# Patient Record
Sex: MALE | Race: WHITE | Employment: FULL TIME | URBAN - METROPOLITAN AREA
[De-identification: names, ages, dates, MRNs, and addresses within clinical notes are randomized per-mention and may not be internally consistent; named-entity substitution may affect disease eponyms.]

---

## 2017-04-06 ENCOUNTER — APPOINTMENT (OUTPATIENT)
Dept: VASCULAR SURGERY | Facility: CLINIC | Age: 52
End: 2017-04-06
Payer: COMMERCIAL

## 2017-04-06 PROCEDURE — 99207 ZZC VEINSOLUTIONS FREE SCREENING: CPT | Performed by: SURGERY

## 2017-05-25 ENCOUNTER — APPOINTMENT (OUTPATIENT)
Dept: VASCULAR SURGERY | Facility: CLINIC | Age: 52
End: 2017-05-25
Payer: COMMERCIAL

## 2017-05-25 ENCOUNTER — OFFICE VISIT (OUTPATIENT)
Dept: VASCULAR SURGERY | Facility: CLINIC | Age: 52
End: 2017-05-25
Payer: COMMERCIAL

## 2017-05-25 ENCOUNTER — TRANSFERRED RECORDS (OUTPATIENT)
Dept: HEALTH INFORMATION MANAGEMENT | Facility: CLINIC | Age: 52
End: 2017-05-25

## 2017-05-25 PROCEDURE — 93970 EXTREMITY STUDY: CPT | Performed by: SURGERY

## 2017-05-25 PROCEDURE — 99204 OFFICE O/P NEW MOD 45 MIN: CPT | Performed by: SURGERY

## 2017-07-10 NOTE — PROGRESS NOTES
[facility]    Letter to [employer]  RE Truong Oreilly   63    To Whom It May Concern:    I am writing regarding one of your insureds, Truong Oreilly, regarding your denial for him to have endovenous ablation of symptomatic great saphenous veins.  In the insurance denial, it was stated that the patient's veins were not symptomatic and, therefore, his procedure would not be covered by insurance.    In my dictation of May 25, 2017 it is clear that he is indeed symptomatic.  He describes aching, tiredness, swelling of his legs and has noted increasing brown discoloration of both of his lower extremities.  He has been wearing gradient compression hose for years and is no longer finding adequate relief of his discomfort with him.    I spoke with Mr. Oreilly on the telephone recently and he states that indeed the pain in his legs and the swelling make it difficult for him to perform his work in the afternoons especially because of the pain and swelling.  This causes his leg to be quite heavy and fatigued, making it difficult for him to get around as he should.  He is unable to elevate his legs at work as he would like.    As per my May 25, 2017 dictation, he has hyperpigmentation from just below his knees to his feet bilaterally.    On the right leg he has 8-10 mm varicosities and 1-2+ edema.  There is a firm lipoma sclerosis in the distal third of his legs bilaterally due to chronic venous hypertension and chronic edema.    In the left lower extremity, he has 4-7 mm varicosities scattered over his left medial leg with 1-2+ edema.    He has 3+ dorsalis pedis pulses bilaterally but no palpable posterior tibial pulses.    Duplex ultrasound did reveal incompetence of his great saphenous veins bilaterally with the right great saphenous vein dilated to 13.6 mm in diameter and the left great saphenous vein dilated to 17.7 mm in diameter.  He has incompetent vein branches measuring up to 12.6 mm in diameter  coursing from his right great saphenous vein and 8 mm incompetent vein branches coursing from his left great saphenous vein.    He does have right small saphenous vein incompetence and left anterior accessory saphenous vein incompetence which I do not feel are of clinical significance at this time.    In the right lower extremity, his Venous Clinical Severity Score (VCSS) is 10 with a CEAP 4 classification.  In his left lower extremity, he has a VCSS of 8 and a CEAP 4 classification.  This denotes advanced venous stasis disease and a significant risk of progression to ulceration.    In summary, I feel that your decision to deny treatment of this patient on the basis that treatment of his venous insufficiency is not medically necessary is in error and this determination should be reconsidered.  I would value the opportunity to discuss his case with you should you have further questions.  Thanks again for your consideration in this matter.    Sincerely,    YAQUELIN Cline M.D.

## 2017-08-04 DIAGNOSIS — I83.813 VARICOSE VEINS OF BILATERAL LOWER EXTREMITIES WITH PAIN: ICD-10-CM

## 2017-08-04 DIAGNOSIS — I83.893 VARICOSE VEINS OF BOTH LEGS WITH EDEMA: Primary | ICD-10-CM

## 2017-08-24 DIAGNOSIS — I83.813 VARICOSE VEINS OF BILATERAL LOWER EXTREMITIES WITH PAIN: ICD-10-CM

## 2017-08-24 DIAGNOSIS — I83.893 VARICOSE VEINS OF BOTH LEGS WITH EDEMA: ICD-10-CM

## 2017-08-24 LAB
BASOPHILS # BLD AUTO: 0 10E9/L (ref 0–0.2)
BASOPHILS NFR BLD AUTO: 0.8 %
DIFFERENTIAL METHOD BLD: ABNORMAL
EOSINOPHIL # BLD AUTO: 0.1 10E9/L (ref 0–0.7)
EOSINOPHIL NFR BLD AUTO: 2.4 %
ERYTHROCYTE [DISTWIDTH] IN BLOOD BY AUTOMATED COUNT: 13.7 % (ref 10–15)
HCT VFR BLD AUTO: 41.7 % (ref 40–53)
HGB BLD-MCNC: 14.5 G/DL (ref 13.3–17.7)
INR PPP: 1.15 (ref 0.86–1.14)
LYMPHOCYTES # BLD AUTO: 1.1 10E9/L (ref 0.8–5.3)
LYMPHOCYTES NFR BLD AUTO: 28.9 %
MCH RBC QN AUTO: 30.9 PG (ref 26.5–33)
MCHC RBC AUTO-ENTMCNC: 34.8 G/DL (ref 31.5–36.5)
MCV RBC AUTO: 89 FL (ref 78–100)
MONOCYTES # BLD AUTO: 0.4 10E9/L (ref 0–1.3)
MONOCYTES NFR BLD AUTO: 9.5 %
NEUTROPHILS # BLD AUTO: 2.2 10E9/L (ref 1.6–8.3)
NEUTROPHILS NFR BLD AUTO: 58.4 %
PLATELET # BLD AUTO: 67 10E9/L (ref 150–450)
RBC # BLD AUTO: 4.7 10E12/L (ref 4.4–5.9)
WBC # BLD AUTO: 3.7 10E9/L (ref 4–11)

## 2017-08-24 PROCEDURE — 85025 COMPLETE CBC W/AUTO DIFF WBC: CPT | Performed by: FAMILY MEDICINE

## 2017-08-24 PROCEDURE — 85610 PROTHROMBIN TIME: CPT | Performed by: FAMILY MEDICINE

## 2017-08-24 PROCEDURE — 36415 COLL VENOUS BLD VENIPUNCTURE: CPT | Performed by: FAMILY MEDICINE

## 2017-09-08 ENCOUNTER — OFFICE VISIT (OUTPATIENT)
Dept: VASCULAR SURGERY | Facility: CLINIC | Age: 52
End: 2017-09-08
Payer: COMMERCIAL

## 2017-09-08 DIAGNOSIS — Z53.9 ERRONEOUS ENCOUNTER--DISREGARD: Primary | ICD-10-CM

## 2017-09-08 PROCEDURE — 36475 ENDOVENOUS RF 1ST VEIN: CPT | Mod: 50 | Performed by: SURGERY

## 2017-09-08 NOTE — PROGRESS NOTES
[facility] Operative Report    PREOPERATIVE DIAGNOSIS  1. CEAP 4 chronic venous insufficiency bilateral lower extremities  2.  Symptomatic bilateral great saphenous vein incompetence    POSTOPERATIVE DIAGNOSIS  Same    PROCEDURE  Radiofrequency ablation bilateral great saphenous veins    SURGEON  KRISTINA RAINEY ASST.  Radha Bardales CST/CSFA    ANESTHESIA  Ativan 3 mg, clonidine 0.1 mg orally with subcutaneous tumescent    OPERATIVE DESCRIPTION  Details of the procedure including risks of bleeding, infection, nerve injury, scarring, hyperpigmentation, deep vein thrombosis, recanalization of the close veins and failure of the varicose veins to resolve were all discussed.  The patient appeared to understand and wish to proceed.  Informed consent was obtained and both legs were marked.    We took the patient to the operating room, placed him supine on the operating table and prepped and draped both groins and both lower extremities sterilely.    A timeout was taken to confirm the appropriate operative site and procedure: Radiofrequency ablation bilateral great saphenous veins    With the operating table in reverse Trendelenburg position, I interrogated the left leg with ultrasound and was able to clearly identify a dilated left great saphenous vein with multiple dilated tributaries coursing from it.  I chose to access the vein near the left mid leg level just distal to the lowest major vein branch.  We overtreated the skin overlying the saphenous vein at this level with 1% lidocaine, placed a micropuncture needle into the vein followed by a guidewire and a 7-Malay sheath.  We passed the closure fast device and positioned the tip 1.70 cm from the left saphenofemoral junction.    I then interrogated the right leg with ultrasound and noted that the last major tributary coursed from the great saphenous vein in the distal third of the right leg.  I also noted that a segment of the great saphenous vein in  the distal thigh was quite small.  There was a large tributary coursing from the more proximal great saphenous vein near the mid thigh distal to which the great saphenous vein was quite small until reapproached by the tributary below the knee.  I inverted the skin overlying the distal great saphenous vein in the distal third of the leg with 1% lidocaine, placed a micropuncture needle into the vein followed by a micropuncture guidewire.  With F the guidewire in place and covered it with a towel.    We confirmed appropriate position of the closure fast device in the left great saphenous vein 1.7 cm from the left saphenofemoral junction with the operating table in Trendelenburg position.  Tumescent anesthetic was injected along the course the great saphenous vein under ultrasound guidance.  After block had taken effect, I applied compression to the great saphenous vein with the additional with a 2 fingerbreadths and treated the first 37 cm increments of the great saphenous vein twice.  Beginning with the fourth 7 cm increment, the remaining vein was treated with one treatment to each 7 cm increment.  He tolerated this well.  I reimaged the vein noted and it was closed and noted that the saphenofemoral junction and common femoral vein for fairly compressible and free of thrombus.  We removed the sheath and the catheter and gained hemostasis with pressure.    The 7-Latvian sheath was then passed over the guidewire into the distal right great saphenous vein and the closure fast device passed through the sheath into the great saphenous vein.  Unfortunately, we could not direct the catheter past the mid calf level.  I once again re-access the vein just cephalad of this, leaving a micropuncture guidewire in the vein.  The distal segment of the great saphenous vein was anesthetized and treated with one session to each 7 cm increment.  Patient was comfortable throughout.  After confirming that this lower segment was closed, we  placed the sheath over the below-knee segment of the great saphenous vein, passed the closure fast device, anesthetized the vein with tumescent anesthetic and then treated the below-knee segment of the vein.    Finally, we placed the 7-Persian sheath over the guidewire into the more proximal great saphenous vein at the mid thigh level and passed the device, positioning the tip 1.73 cm from the right saphenofemoral junction.  After tumescent anesthetic And injected along the course the saphenous vein, we treated the vein for two sessions to each 7 cm increment for the first three increments then one session to each increment to the skin exit site.  I confirmed that the vein was closed and at the saphenofemoral junction and common femoral veins were fairly compressible and free of thrombus.  We removed the sheath and the catheter and gained hemostasis with pressure.    The leg was cleaned with saline solution and small gauze and Tegaderm dressings were applied to each access site.  Thigh high compression stockings were placed and the patient was observed for 30 minutes in recovery.    We used a total of 572 mL of tumescent anesthetic to treat 59 cm of the right great saphenous vein, 50 cm of the left great saphenous vein using 12 RF cycles to treat the right great saphenous vein and 11 R cycle to treat the left great saphenous vein.  Used a total of four mL of 1% lidocaine.    The patient seemed to tolerate the procedure well without any obvious complications.    KRISTINA Cline M.D.

## 2017-09-08 NOTE — MR AVS SNAPSHOT
"              After Visit Summary   2017    Truong Oreilly    MRN: [id number]           Patient Information     Date Of Birth          1965        Visit Information        Provider Department      2017 1:00 PM Wyatt lCine MD;  VEIN PROCEDURE ROOM 1;  [facility]      Today's Diagnoses     ERRONEOUS ENCOUNTER--DISREGARD    -  1       Follow-ups after your visit        Who to contact     If you have questions or need follow up information about today's clinic visit or your schedule please contact [facility] directly at [phone].  Normal or non-critical lab and imaging results will be communicated to you by V[employer], letter or phone within 4 business days after the clinic has received the results. If you do not hear from us within 7 days, please contact the clinic through VivaRealhart or phone. If you have a critical or abnormal lab result, we will notify you by phone as soon as possible.  Submit refill requests through [employer] or call your pharmacy and they will forward the refill request to us. Please allow 3 business days for your refill to be completed.          Additional Information About Your Visit        MyChart Information     [employer] lets you send messages to your doctor, view your test results, renew your prescriptions, schedule appointments and more. To sign up, go to [email or web address] . Click on \"Log in\" on the left side of the screen, which will take you to the Welcome page. Then click on \"Sign up Now\" on the right side of the page.     You will be asked to enter the access code listed below, as well as some personal information. Please follow the directions to create your username and password.     Your access code is: [email or web address]  Expires: 2018  3:10 PM     Your access code will  in 90 days. If you need help or a new code, please call your [place] clinic or " [phone].        Care EveryWhere ID     This is your Care EveryWhere ID. This could be used by other organizations to access your [place] medical records  [id number]         Blood Pressure from Last 3 Encounters:   11/29/17 104/74   10/11/16 118/77   06/26/14 107/64    Weight from Last 3 Encounters:   11/29/17 112.5 kg (248 lb)   10/11/16 112 kg (247 lb)   06/26/14 107.4 kg (236 lb 12.8 oz)              Today, you had the following     No orders found for display       Primary Care Provider Office Phone # Fax #    [facility] [phone]       [address]  [facility] [address]        Equal Access to Services     PAUL PATEL : Hadkenny pressleyo Soludy, waaxda luqadaha, qaybta kaalmada adeegyada, nathaniel torrez. So [facility] [phone].    ATENCIÓN: Si habla español, tiene a sanchez disposición servicios gratuitos de asistencia lingüística. Llame al [phone].    We comply with applicable federal civil rights laws and [place] laws. We do not discriminate on the basis of race, color, national origin, age, disability, sex, sexual orientation, or gender identity.            Thank you!     Thank you for choosing [facility]  for your care. Our goal is always to provide you with excellent care. Hearing back from our patients is one way we can continue to improve our services. Please take a few minutes to complete the written survey that you may receive in the mail after your visit with us. Thank you!             Your Updated Medication List - Protect others around you: Learn how to safely use, store and throw away your medicines at www.disposemymeds.org.      Notice  As of 9/8/2017 11:59 PM    You have not been prescribed any medications.

## 2017-09-11 ENCOUNTER — APPOINTMENT (OUTPATIENT)
Dept: VASCULAR SURGERY | Facility: CLINIC | Age: 52
End: 2017-09-11

## 2017-09-11 PROCEDURE — 93970 EXTREMITY STUDY: CPT | Performed by: SURGERY

## 2017-09-11 PROCEDURE — 99207 ZZC VEINSOLUTIONS 48 HOUR: CPT | Performed by: SURGERY

## 2017-10-20 ENCOUNTER — OFFICE VISIT (OUTPATIENT)
Dept: VASCULAR SURGERY | Facility: CLINIC | Age: 52
End: 2017-10-20
Payer: COMMERCIAL

## 2017-10-20 DIAGNOSIS — I87.2 VENOUS STASIS DERMATITIS OF BOTH LOWER EXTREMITIES: Primary | ICD-10-CM

## 2017-10-20 DIAGNOSIS — I83.812 VARICOSE VEINS OF LEFT LOWER EXTREMITY WITH PAIN: ICD-10-CM

## 2017-10-20 DIAGNOSIS — I83.893 SYMPTOMATIC VARICOSE VEINS OF BOTH LOWER EXTREMITIES: ICD-10-CM

## 2017-10-20 PROCEDURE — 99207 ZZC VEINSOLUTIONS POST OPERATIVE VISIT: CPT | Performed by: SURGERY

## 2017-10-20 NOTE — PROGRESS NOTES
[facility]  Progress Note    Truong Oreilly returns in six week follow-up of endovenous ablation of his great saphenous veins bilaterally.  He states that his leg pain and swelling has improved significantly and that he is able to walk six flights of stairs without becoming short of breath.  Prior to his procedure, he states he could walk three flights of stairs before coming quite fatigued.  He continues to wear knee-high gradient compression hose on a daily basis.    Physical exam reveals venous stasis hyperpigmentation over his legs bilaterally from the mid legs to his feet.  I do not appreciate significant bulging varicose veins on his right leg but do note a 6 mm varicosities coursing from the left medial calf, posteriorly and then distally, coursing anteriorly down onto the dorsum of the left foot.  The left foot and leg still have 2+ edema while the right leg has 1+ edema.    Impression  Residual sizable great saphenous vein tributaries left lower extremity, status post endovenous ablation of the left great saphenous vein.  He has CEAP 4 chronic venous insufficiency and is not a candidate for phlebectomies given his thrombocytopenia.  I feel that he would benefit from sclerotherapy of the residual varicose veins on his left leg in an effort to further reduce venous hypertension, swelling and prevent progression to dermatitis or ulceration.  Details of ultrasound guided, medically necessary sclerotherapy including risks of allergic reaction, ulceration, deep vein thrombosis and trapped blood were all discussed.  He also may develop hyperpigmentation along the course of the treated vein.  He would likely need 3 sessions.    We will submit this for insurance approval and hopefully can proceed in the near future.    KRISTINA Cline M.D.

## 2017-11-29 ENCOUNTER — OFFICE VISIT (OUTPATIENT)
Dept: PEDIATRICS | Facility: CLINIC | Age: 52
End: 2017-11-29
Payer: COMMERCIAL

## 2017-11-29 VITALS
OXYGEN SATURATION: 97 % | HEART RATE: 66 BPM | SYSTOLIC BLOOD PRESSURE: 104 MMHG | TEMPERATURE: 95.6 F | WEIGHT: 248 LBS | HEIGHT: 72 IN | DIASTOLIC BLOOD PRESSURE: 74 MMHG | BODY MASS INDEX: 33.59 KG/M2

## 2017-11-29 DIAGNOSIS — Z00.00 ROUTINE GENERAL MEDICAL EXAMINATION AT A HEALTH CARE FACILITY: Primary | ICD-10-CM

## 2017-11-29 DIAGNOSIS — D64.9 NORMOCYTIC ANEMIA: ICD-10-CM

## 2017-11-29 DIAGNOSIS — D68.9 COAGULOPATHY (H): ICD-10-CM

## 2017-11-29 DIAGNOSIS — Z12.5 SCREENING FOR PROSTATE CANCER: ICD-10-CM

## 2017-11-29 DIAGNOSIS — D50.9 IRON DEFICIENCY ANEMIA, UNSPECIFIED IRON DEFICIENCY ANEMIA TYPE: ICD-10-CM

## 2017-11-29 DIAGNOSIS — Z23 NEED FOR PROPHYLACTIC VACCINATION AND INOCULATION AGAINST INFLUENZA: ICD-10-CM

## 2017-11-29 DIAGNOSIS — I85.10 SECONDARY ESOPHAGEAL VARICES WITHOUT BLEEDING (H): ICD-10-CM

## 2017-11-29 DIAGNOSIS — K70.30 ALCOHOLIC CIRRHOSIS OF LIVER WITHOUT ASCITES (H): ICD-10-CM

## 2017-11-29 DIAGNOSIS — Z11.59 NEED FOR HEPATITIS C SCREENING TEST: ICD-10-CM

## 2017-11-29 DIAGNOSIS — D69.6 THROMBOCYTOPENIA (H): ICD-10-CM

## 2017-11-29 LAB
AFP SERPL-MCNC: 2.1 UG/L (ref 0–8)
ALBUMIN SERPL-MCNC: 3.4 G/DL (ref 3.4–5)
ALP SERPL-CCNC: 95 U/L (ref 40–150)
ALT SERPL W P-5'-P-CCNC: 35 U/L (ref 0–70)
ANION GAP SERPL CALCULATED.3IONS-SCNC: 5 MMOL/L (ref 3–14)
AST SERPL W P-5'-P-CCNC: 32 U/L (ref 0–45)
BILIRUB SERPL-MCNC: 1.3 MG/DL (ref 0.2–1.3)
BUN SERPL-MCNC: 13 MG/DL (ref 7–30)
CALCIUM SERPL-MCNC: 8.8 MG/DL (ref 8.5–10.1)
CHLORIDE SERPL-SCNC: 108 MMOL/L (ref 94–109)
CO2 SERPL-SCNC: 25 MMOL/L (ref 20–32)
CREAT SERPL-MCNC: 0.89 MG/DL (ref 0.66–1.25)
ERYTHROCYTE [DISTWIDTH] IN BLOOD BY AUTOMATED COUNT: 13.2 % (ref 10–15)
GFR SERPL CREATININE-BSD FRML MDRD: 90 ML/MIN/1.7M2
GLUCOSE SERPL-MCNC: 107 MG/DL (ref 70–99)
HCT VFR BLD AUTO: 39.3 % (ref 40–53)
HCV AB SERPL QL IA: NONREACTIVE
HGB BLD-MCNC: 13.2 G/DL (ref 13.3–17.7)
INR PPP: 1.15 (ref 0.86–1.14)
MCH RBC QN AUTO: 30.6 PG (ref 26.5–33)
MCHC RBC AUTO-ENTMCNC: 33.6 G/DL (ref 31.5–36.5)
MCV RBC AUTO: 91 FL (ref 78–100)
PLATELET # BLD AUTO: 48 10E9/L (ref 150–450)
POTASSIUM SERPL-SCNC: 3.7 MMOL/L (ref 3.4–5.3)
PROT SERPL-MCNC: 7.4 G/DL (ref 6.8–8.8)
PSA SERPL-ACNC: 1.11 UG/L (ref 0–4)
RBC # BLD AUTO: 4.31 10E12/L (ref 4.4–5.9)
SODIUM SERPL-SCNC: 138 MMOL/L (ref 133–144)
WBC # BLD AUTO: 2.9 10E9/L (ref 4–11)

## 2017-11-29 PROCEDURE — 85610 PROTHROMBIN TIME: CPT | Performed by: INTERNAL MEDICINE

## 2017-11-29 PROCEDURE — 80053 COMPREHEN METABOLIC PANEL: CPT | Performed by: INTERNAL MEDICINE

## 2017-11-29 PROCEDURE — 83540 ASSAY OF IRON: CPT | Performed by: INTERNAL MEDICINE

## 2017-11-29 PROCEDURE — 99213 OFFICE O/P EST LOW 20 MIN: CPT | Mod: 25 | Performed by: INTERNAL MEDICINE

## 2017-11-29 PROCEDURE — 82728 ASSAY OF FERRITIN: CPT | Performed by: INTERNAL MEDICINE

## 2017-11-29 PROCEDURE — 83550 IRON BINDING TEST: CPT | Performed by: INTERNAL MEDICINE

## 2017-11-29 PROCEDURE — 36415 COLL VENOUS BLD VENIPUNCTURE: CPT | Performed by: INTERNAL MEDICINE

## 2017-11-29 PROCEDURE — 82607 VITAMIN B-12: CPT | Performed by: INTERNAL MEDICINE

## 2017-11-29 PROCEDURE — 84443 ASSAY THYROID STIM HORMONE: CPT | Performed by: INTERNAL MEDICINE

## 2017-11-29 PROCEDURE — 86803 HEPATITIS C AB TEST: CPT | Performed by: INTERNAL MEDICINE

## 2017-11-29 PROCEDURE — G0103 PSA SCREENING: HCPCS | Performed by: INTERNAL MEDICINE

## 2017-11-29 PROCEDURE — 82105 ALPHA-FETOPROTEIN SERUM: CPT | Performed by: INTERNAL MEDICINE

## 2017-11-29 PROCEDURE — 90471 IMMUNIZATION ADMIN: CPT | Performed by: INTERNAL MEDICINE

## 2017-11-29 PROCEDURE — 90686 IIV4 VACC NO PRSV 0.5 ML IM: CPT | Performed by: INTERNAL MEDICINE

## 2017-11-29 PROCEDURE — 99396 PREV VISIT EST AGE 40-64: CPT | Mod: 25 | Performed by: INTERNAL MEDICINE

## 2017-11-29 PROCEDURE — 85027 COMPLETE CBC AUTOMATED: CPT | Performed by: INTERNAL MEDICINE

## 2017-11-29 NOTE — PATIENT INSTRUCTIONS
Make appointment(s) for:   -- get labs done today  -- abdominal ultrasound.         Preventive Health Recommendations  Male Ages 50   64    Yearly exam:             See your health care provider every year in order to  o   Review health changes.   o   Discuss preventive care.    o   Review your medicines if your doctor has prescribed any.     Have a cholesterol test every 5 years, or more frequently if you are at risk for high cholesterol/heart disease.     Have a diabetes test (fasting glucose) every three years. If you are at risk for diabetes, you should have this test more often.     Have a colonoscopy at age 50, or have a yearly FIT test (stool test). These exams will check for colon cancer.      Talk with your health care provider about whether or not a prostate cancer screening test (PSA) is right for you.    You should be tested each year for STDs (sexually transmitted diseases), if you re at risk.     Shots: Get a flu shot each year. Get a tetanus shot every 10 years.     Nutrition:    Eat at least 5 servings of fruits and vegetables daily.     Eat whole-grain bread, whole-wheat pasta and brown rice instead of white grains and rice.     Talk to your provider about Calcium and Vitamin D.     Lifestyle    Exercise for at least 150 minutes a week (30 minutes a day, 5 days a week). This will help you control your weight and prevent disease.     Limit alcohol to one drink per day.     No smoking.     Wear sunscreen to prevent skin cancer.     See your dentist every six months for an exam and cleaning.     See your eye doctor every 1 to 2 years.

## 2017-11-29 NOTE — MR AVS SNAPSHOT
After Visit Summary   11/29/2017    Truong Oreilly    MRN: [id number]           Patient Information     Date Of Birth          1965        Visit Information        Provider Department      11/29/2017 8:10 AM Jackelyn Kulkarni MD PhD [facility]        Today's Diagnoses     Routine general medical examination at a health care facility    -  1    Need for prophylactic vaccination and inoculation against influenza        Alcoholic cirrhosis of liver without ascites (H)        Need for hepatitis C screening test        Screening for prostate cancer          Care Instructions    Make appointment(s) for:   -- get labs done today  -- abdominal ultrasound.         Preventive Health Recommendations  Male Ages 50 - 64    Yearly exam:             See your health care provider every year in order to  o   Review health changes.   o   Discuss preventive care.    o   Review your medicines if your doctor has prescribed any.     Have a cholesterol test every 5 years, or more frequently if you are at risk for high cholesterol/heart disease.     Have a diabetes test (fasting glucose) every three years. If you are at risk for diabetes, you should have this test more often.     Have a colonoscopy at age 50, or have a yearly FIT test (stool test). These exams will check for colon cancer.      Talk with your health care provider about whether or not a prostate cancer screening test (PSA) is right for you.    You should be tested each year for STDs (sexually transmitted diseases), if you re at risk.     Shots: Get a flu shot each year. Get a tetanus shot every 10 years.     Nutrition:    Eat at least 5 servings of fruits and vegetables daily.     Eat whole-grain bread, whole-wheat pasta and brown rice instead of white grains and rice.     Talk to your provider about Calcium and Vitamin D.     Lifestyle    Exercise for at least 150 minutes a week (30 minutes a day, 5 days a week). This will help you control  your weight and prevent disease.     Limit alcohol to one drink per day.     No smoking.     Wear sunscreen to prevent skin cancer.     See your dentist every six months for an exam and cleaning.     See your eye doctor every 1 to 2 years.            Follow-ups after your visit        Follow-up notes from your care team     Return for Imaging.      Future tests that were ordered for you today     Open Future Orders        Priority Expected Expires Ordered    US Abdomen Complete Routine  11/29/2018 11/29/2017            Who to contact     If you have questions or need follow up information about today's clinic visit or your schedule please contact [facility] directly at [phone].  Normal or non-critical lab and imaging results will be communicated to you by MyChart, letter or phone within 4 business days after the clinic has received the results. If you do not hear from us within 7 days, please contact the clinic through MyChart or phone. If you have a critical or abnormal lab result, we will notify you by phone as soon as possible.  Submit refill requests through [employer] or call your pharmacy and they will forward the refill request to us. Please allow 3 business days for your refill to be completed.          Additional Information About Your Visit        Care EveryWhere ID     This is your Care EveryWhere ID. This could be used by other organizations to access your [place] medical records  [id number]        Your Vitals Were     Pulse Temperature Height Pulse Oximetry BMI (Body Mass Index)       66 95.6  F (35.3  C) (Temporal) 6' (1.829 m) 97% 33.63 kg/m2        Blood Pressure from Last 3 Encounters:   11/29/17 104/74   10/11/16 118/77   06/26/14 107/64    Weight from Last 3 Encounters:   11/29/17 248 lb (112.5 kg)   10/11/16 247 lb (112 kg)   06/26/14 236 lb 12.8 oz (107.4 kg)              We Performed the Following     AFP tumor marker     CBC with platelets     Comprehensive metabolic  panel (BMP + Alb, Alk Phos, ALT, AST, Total. Bili, TP)     FLU VAC, SPLIT VIRUS IM > 3 YO (QUADRIVALENT) [51786]     Hepatitis C antibody     INR     PSA, screen     Vaccine Administration, Initial [[id number]]        Primary Care Provider Office Phone # Fax #    [facility] [phone] [address]  [facility] [address]        Equal Access to Services     PAUL PATEL : Hadii aad ku hadasho Soomaali, waaxda luqadaha, qaybta kaalmada adeegyada, waxay idiin hayaan adeeg kharash la'aan . So [facility] [phone].    ATENCIÓN: Si habla español, tiene a sanchez disposición servicios gratuitos de asistencia lingüística. Llame al [phone].    We comply with applicable federal civil rights laws and [place] laws. We do not discriminate on the basis of race, color, national origin, age, disability, sex, sexual orientation, or gender identity.            Thank you!     Thank you for choosing [facility]  for your care. Our goal is always to provide you with excellent care. Hearing back from our patients is one way we can continue to improve our services. Please take a few minutes to complete the written survey that you may receive in the mail after your visit with us. Thank you!             Your Updated Medication List - Protect others around you: Learn how to safely use, store and throw away your medicines at www.disposemymeds.org.      Notice  As of 11/29/2017  9:03 AM    You have not been prescribed any medications.

## 2017-11-29 NOTE — PROGRESS NOTES
SUBJECTIVE:   CC: Truong Oreilly is an 52 year old male who presents for preventative health visit.     Healthy Habits:    Do you get at least three servings of calcium containing foods daily (dairy, green leafy vegetables, etc.)? yes    Amount of exercise or daily activities, outside of work: 7 day(s) per week    Problems taking medications regularly No    Medication side effects: No    Have you had an eye exam in the past two years? yes    Do you see a dentist twice per year? yes  Do you have sleep apnea, excessive snoring or daytime drowsiness?no      PROBLEMS TO ADD ON...  This is a new patient to this provider.  He is to see my colleagues.  He has a history of alcoholic cirrhosis, gallstone related pancreatitis, history of kidney stones.  Patient has not seen a liver specialist in our care system.  I spent quite some time reviewing patient's history with him as well as from care everywhere.    Patient was diagnosed with liver cirrhosis back in 2000, subsequently referred to [facility] for evaluation around 2003.  He had extensive workup, determined that this was likely alcohol related.  Patient has quit drinking alcohol in 2000.  Around 2011, patient saw liver specialist at [facility].  At that time he had a surveillance upper endoscopy, diagnosed with grade 1 esophageal varices.  The plan was to for him to return in 1 year for endoscopy surveillance, abdominal ultrasound and AFP.  However this was lost to follow up.  In 2014 patient developed gallstone pancreatitis, had ERCP.  Patient has not had any issues since that time.    He reports having had 2 colonoscopies, the most recent one around 2014.  I did not find any record of this at the [facility] system.    He also has a diagnosis of normal study normochromic anemia, coagulopathy, thrombocytopenia, varicose veins of the legs.    Today's PHQ-2 Score:   PHQ-2 ( 1999 Pfizer) 10/11/2016 2/14/2014   Q1: Little interest or  pleasure in doing things 0 0   Q2: Feeling down, depressed or hopeless 0 0   PHQ-2 Score 0 0       Abuse: Current or Past(Physical, Sexual or Emotional)- No  Do you feel safe in your environment - Yes    Social History   Substance Use Topics     Smoking status: Never Smoker     Smokeless tobacco: Not on file     Alcohol use No     The patient does not drink >3 drinks per day nor >7 drinks per week.    Last PSA:   PSA   Date Value Ref Range Status   10/11/2016 0.36 0 - 4 ug/L Final     Comment:     Assay Method:  Chemiluminescence using Siemens Vista analyzer       Reviewed orders with patient. Reviewed health maintenance and updated orders accordingly - Yes  Labs reviewed in EPIC    Reviewed and updated as needed this visit by clinical staff  Tobacco  Allergies  Meds  Med Hx  Surg Hx  Fam Hx  Soc Hx        Reviewed and updated as needed this visit by Provider              ROS:  C: NEGATIVE for fever, chills, change in weight  I: NEGATIVE for worrisome rashes, moles or lesions  E: NEGATIVE for vision changes or irritation  ENT: NEGATIVE for ear, mouth and throat problems  R: NEGATIVE for significant cough or SOB  CV: NEGATIVE for chest pain, palpitations or peripheral edema  GI: NEGATIVE for nausea, abdominal pain, heartburn, or change in bowel habits   male: negative for dysuria, hematuria, decreased urinary stream, erectile dysfunction, urethral discharge  M: NEGATIVE for significant arthralgias or myalgia  N: NEGATIVE for weakness, dizziness or paresthesias  P: NEGATIVE for changes in mood or affect    OBJECTIVE:   /74  Pulse 66  Temp 95.6  F (35.3  C) (Temporal)  Ht 6' (1.829 m)  Wt 248 lb (112.5 kg)  SpO2 97%  BMI 33.63 kg/m2  EXAM:  GENERAL: healthy, alert and no distress  EYES: Eyes grossly normal to inspection, PERRL and conjunctivae and sclerae normal  HENT: ear canals and TM's normal, nose and mouth without ulcers or lesions  NECK: no adenopathy, no asymmetry, masses, or scars and  thyroid normal to palpation  RESP: lungs clear to auscultation - no rales, rhonchi or wheezes  CV: regular rate and rhythm, normal S1 S2, no S3 or S4, no murmur, click or rub, no peripheral edema and peripheral pulses strong  ABDOMEN: soft, nontender, no hepatosplenomegaly, no masses and bowel sounds normal  MS: no gross musculoskeletal defects noted, no edema  SKIN: no suspicious lesions or rashes  NEURO: Normal strength and tone, mentation intact and speech normal  PSYCH: mentation appears normal, affect normal/bright    ASSESSMENT/PLAN:       ICD-10-CM    1. Routine general medical examination at a health care facility Z00.00    2. Need for prophylactic vaccination and inoculation against influenza Z23 FLU VAC, SPLIT VIRUS IM > 3 YO (QUADRIVALENT) [18293]     Vaccine Administration, Initial [41545]   3. Alcoholic cirrhosis of liver without ascites (H) K70.30 Comprehensive metabolic panel (BMP + Alb, Alk Phos, ALT, AST, Total. Bili, TP)     CBC with platelets     INR     AFP tumor marker     US Abdomen Complete   4. Need for hepatitis C screening test Z11.59 Hepatitis C antibody   5. Screening for prostate cancer Z12.5 PSA, screen   6. Secondary esophageal varices without bleeding (H) I85.10    7. Thrombocytopenia (H) D69.6    8. Coagulopathy (H) D68.9        -- History liver cirrhosis, we will obtain monitoring labs and repeat abdominal ultrasound.  -- Have asked the patient to contact his previous PCP, to see if he could locate the prior colonoscopy report.  -- We discussed surveillance endoscopy to follow up on history of esophageal varices.  Patient reports he has not had any problems of GI bleed, and does not want to pursue this at this time.  -- Further recommendation pending on his lab results/abdominal ultrasound.      COUNSELING:  Reviewed preventive health counseling, as reflected in patient instructions           reports that he has never smoked. He does not have any smokeless tobacco history on  file.      Estimated body mass index is 33.63 kg/(m^2) as calculated from the following:    Height as of this encounter: 6' (1.829 m).    Weight as of this encounter: 248 lb (112.5 kg).   Weight management plan: Weight is stable    Counseling Resources:  ATP IV Guidelines  Pooled Cohorts Equation Calculator  FRAX Risk Assessment  ICSI Preventive Guidelines  Dietary Guidelines for Americans, 2010  USDA's MyPlate  ASA Prophylaxis  Lung CA Screening    Jackelyn Kulkarni MD PhD  [facility]  Injectable Influenza Immunization Documentation    1.  Is the person to be vaccinated sick today?   No    2. Does the person to be vaccinated have an allergy to a component   of the vaccine?   No  Egg Allergy Algorithm Link    3. Has the person to be vaccinated ever had a serious reaction   to influenza vaccine in the past?   No    4. Has the person to be vaccinated ever had Guillain-Barré syndrome?   No    Form completed by Ana Paula HASSANA

## 2017-11-29 NOTE — NURSING NOTE
Chief Complaint   Patient presents with     Physical       Initial /74  Pulse 66  Temp 95.6  F (35.3  C) (Temporal)  Ht 6' (1.829 m)  Wt 248 lb (112.5 kg)  SpO2 97%  BMI 33.63 kg/m2 Estimated body mass index is 33.63 kg/(m^2) as calculated from the following:    Height as of this encounter: 6' (1.829 m).    Weight as of this encounter: 248 lb (112.5 kg).  Medication Reconciliation: complete

## 2017-11-30 LAB
FERRITIN SERPL-MCNC: 367 NG/ML (ref 26–388)
IRON SATN MFR SERPL: 9 % (ref 15–46)
IRON SERPL-MCNC: 22 UG/DL (ref 35–180)
TIBC SERPL-MCNC: 232 UG/DL (ref 240–430)
TSH SERPL DL<=0.005 MIU/L-ACNC: 2 MU/L (ref 0.4–4)
VIT B12 SERPL-MCNC: 517 PG/ML (ref 193–986)

## 2017-11-30 NOTE — PROGRESS NOTES
Dear Truong,   Here are your recent results.   -- prostate maker, hepatitis C antibody and AFP tumor marker were within normal limits.   -- Your blood sugar is in the prediabetes range. Prediabetes means that your blood sugar level is higher than normal, but it's not yet increased enough to be classified as type 2 diabetes. Still, without intervention, prediabetes is likely to become type 2 diabetes in 10 years or less. And, if you have prediabetes, the long-term damage of diabetes   especially to your heart and circulatory system   may already be starting.     There's good news, however. Prediabetes can be an opportunity for you to improve your health, because progression from prediabetes to type 2 diabetes isn't inevitable. With healthy lifestyle changes   such as eating healthy foods, including physical activity in your daily routine and maintaining a healthy weight   you may be able to bring your blood sugar level back to normal.     -- blood count and liver function were abnormal with low WBC, platelet and slightly increased INR. These are related to liver cirrhosis.   -- hemoglobin is borderline low. I ordered more tests to evaluate them. If you notice any blood in the stool or black tarry stool, we'll have to look for cause for GI bleed.     Please call or Mychart to our office if you have further questions.     Jackelyn Kulkarni MD-PhD

## 2017-12-01 ENCOUNTER — RADIANT APPOINTMENT (OUTPATIENT)
Dept: ULTRASOUND IMAGING | Facility: CLINIC | Age: 52
End: 2017-12-01
Attending: INTERNAL MEDICINE
Payer: COMMERCIAL

## 2017-12-01 DIAGNOSIS — K70.30 ALCOHOLIC CIRRHOSIS OF LIVER WITHOUT ASCITES (H): ICD-10-CM

## 2017-12-01 PROCEDURE — 76700 US EXAM ABDOM COMPLETE: CPT | Performed by: RADIOLOGY

## 2017-12-10 NOTE — PROGRESS NOTES
Send letter with the following comment:         Dear Truong,     Enclosed is a copy of your test results.    -- liver ultrasound showed chronic stable findings: gallstone, enlarged liver, periumbilical veins consistent with cirrhosis and protal hypertension.   -- continue healthy life style as you are doing.     If you have additional question, please call or make a follow-up appointment.    Jackelyn Kulkarni MD-PhD

## 2017-12-11 ENCOUNTER — TELEPHONE (OUTPATIENT)
Dept: PEDIATRICS | Facility: CLINIC | Age: 52
End: 2017-12-11

## 2017-12-11 NOTE — TELEPHONE ENCOUNTER
Patient given information below.  He will stop in tomorrow morning to  stool kit in lab.    Giana Arthur RN,   [facility]

## 2017-12-11 NOTE — TELEPHONE ENCOUNTER
Please call the patient and notify him of the results as noted below.    Jody Mascorro RN   .[facility], Primary Care    Notes Recorded by Jackelyn Kulkarni MD PhD on 12/10/2017 at 3:20 PM  Please call patient. His hemoglobin done on 11/30/17 showed mild anemia. I sent additional testing that showed he is iron deficient. I'm concerned if he may have new variceal bleeding (slowly) or bleeding elsewhere in the intestines. I recommend doing stool testing to see if there is blood in the stool. If there is, I would recommend pursue EGD and colonoscopy to identify site of bleeding. Lab ordered for stool testing. Please have patient  stool testing kit in the lab and send them back.

## 2018-01-02 PROCEDURE — 82270 OCCULT BLOOD FECES: CPT | Performed by: INTERNAL MEDICINE

## 2018-01-08 DIAGNOSIS — D50.9 IRON DEFICIENCY ANEMIA, UNSPECIFIED IRON DEFICIENCY ANEMIA TYPE: ICD-10-CM

## 2018-01-08 LAB
COLLECT DATE SP2 STL: NORMAL
COLLECT DATE SP3 STL: NORMAL
COLLECT DATE STL: NORMAL
HEMOCCULT SP1 STL QL: NEGATIVE
HEMOCCULT SP2 STL QL: NEGATIVE
HEMOCCULT SP3 STL QL: NEGATIVE

## 2018-01-21 NOTE — PROGRESS NOTES
Send letter with the following comment:         Dear Truong,     Enclosed is a copy of your test results.    -- stool testing showed no blood in the stool. The iron deficiency is likely nutritional. I recommend increase iron rich foods for 1 month and see if the hemoglobin and iron level improves. I have already ordered this as a future order in the computer.     If you have additional question, please call or make a follow-up appointment.    Jackelyn Kulkarni MD-PhD      Here is information from [facility] web site regarding increase iron intake through foods.     [email or web address]    Prevention  By [facility] staff   You can reduce your risk of iron deficiency anemia by choosing iron-rich foods.   Choose iron-rich foods   Foods rich in iron include:   Beans   Dark green leafy vegetables, such as spinach   Dried fruit, such as raisins and apricots   Eggs   Iron-fortified cereals, breads and pastas   Peas   Pork   Poultry   Red meat   Seafood   Your body absorbs more iron from meat than it does from other sources. If you choose to not eat meat, you may need to increase your intake of iron-rich, plant-based foods to absorb the same amount of iron as someone who eats meat.   Choose foods containing vitamin C to enhance iron absorption   You can enhance your body's absorption of iron by drinking citrus juice or eating other foods rich in vitamin C at the same time that you eat high-iron foods. Vitamin C in citrus juices, like orange juice, helps your body to better absorb dietary iron.   Vitamin C is also found in:   Broccoli   Grapefruit   Kiwi   Leafy greens   Mangoes   Melons   Oranges   Peppers   Strawberries   Tomatoes   Preventing iron deficiency anemia in infants   To prevent iron deficiency anemia in infants, feed your baby breast milk or iron-fortified formula for the first year. Cow's milk isn't a good source of iron for babies and isn't recommended  for infants under one year. Iron from breast milk is more easily absorbed than the iron found in formula.

## 2018-05-16 DIAGNOSIS — Z01.89 ROUTINE LAB DRAW: Primary | ICD-10-CM

## 2018-06-06 ENCOUNTER — TELEPHONE (OUTPATIENT)
Dept: PEDIATRICS | Facility: CLINIC | Age: 53
End: 2018-06-06

## 2018-06-06 NOTE — TELEPHONE ENCOUNTER
Read patient Dr. Kulkarni's note below and gave him the phone number to schedule an appointment with hematology.  He will call to schedule.    Giana Arthur RN,   [facility]

## 2018-06-06 NOTE — TELEPHONE ENCOUNTER
Please call patient.  I received dentist requests regarding optimizing his platelet count for dental extraction.  Back in February, this procedure was canceled due to his low platelet count.  We have at that time refer him to see hematology to make recommendation for optimizing his platelet count and reduce bleeding during procedure.  I do not see any appointment scheduled for this.  Please contact patient to schedule this appointment.  I will not be able to clear him for dental extraction without it.

## 2018-06-06 NOTE — TELEPHONE ENCOUNTER
Left message for patient to return call to clinic and ask to speak with RN.    If patient returns call and nurse unavailable, please ask patient if a detailed message can be left on voicemail.    Giana Arthur RN,   [facility]          Hematology referral from 2/3/18  Your provider has referred you to: [facility]: Cancer Care/Hematology (All Cancer Related Services) - [place] [phone]   [email or web address]    Recommendation for surgical procedures/dental extractions.

## 2018-06-22 ENCOUNTER — ONCOLOGY VISIT (OUTPATIENT)
Dept: ONCOLOGY | Facility: CLINIC | Age: 53
End: 2018-06-22
Payer: COMMERCIAL

## 2018-06-22 VITALS
TEMPERATURE: 98.1 F | WEIGHT: 248 LBS | BODY MASS INDEX: 33.59 KG/M2 | HEART RATE: 59 BPM | OXYGEN SATURATION: 97 % | RESPIRATION RATE: 16 BRPM | DIASTOLIC BLOOD PRESSURE: 71 MMHG | HEIGHT: 72 IN | SYSTOLIC BLOOD PRESSURE: 112 MMHG

## 2018-06-22 DIAGNOSIS — K70.30 ALCOHOLIC CIRRHOSIS OF LIVER WITHOUT ASCITES (H): ICD-10-CM

## 2018-06-22 DIAGNOSIS — D61.818 PANCYTOPENIA (H): Primary | ICD-10-CM

## 2018-06-22 LAB
ALBUMIN SERPL-MCNC: 4 G/DL (ref 3.4–5)
ALP SERPL-CCNC: 83 U/L (ref 40–150)
ALT SERPL W P-5'-P-CCNC: 27 U/L (ref 0–70)
ANION GAP SERPL CALCULATED.3IONS-SCNC: 7 MMOL/L (ref 3–14)
AST SERPL W P-5'-P-CCNC: 26 U/L (ref 0–45)
BASOPHILS # BLD AUTO: 0.1 10E9/L (ref 0–0.2)
BASOPHILS NFR BLD AUTO: 1.5 %
BILIRUB SERPL-MCNC: 2.5 MG/DL (ref 0.2–1.3)
BUN SERPL-MCNC: 11 MG/DL (ref 7–30)
CALCIUM SERPL-MCNC: 9.2 MG/DL (ref 8.5–10.1)
CHLORIDE SERPL-SCNC: 107 MMOL/L (ref 94–109)
CO2 SERPL-SCNC: 26 MMOL/L (ref 20–32)
CREAT SERPL-MCNC: 0.88 MG/DL (ref 0.66–1.25)
DIFFERENTIAL METHOD BLD: ABNORMAL
EOSINOPHIL # BLD AUTO: 0.1 10E9/L (ref 0–0.7)
EOSINOPHIL NFR BLD AUTO: 2.7 %
ERYTHROCYTE [DISTWIDTH] IN BLOOD BY AUTOMATED COUNT: 13.2 % (ref 10–15)
FOLATE SERPL-MCNC: 18.8 NG/ML
GFR SERPL CREATININE-BSD FRML MDRD: 90 ML/MIN/1.7M2
GLUCOSE SERPL-MCNC: 85 MG/DL (ref 70–99)
HCT VFR BLD AUTO: 42.1 % (ref 40–53)
HGB BLD-MCNC: 13.9 G/DL (ref 13.3–17.7)
IMM GRANULOCYTES # BLD: 0 10E9/L (ref 0–0.4)
IMM GRANULOCYTES NFR BLD: 0.5 %
IRON SATN MFR SERPL: 41 % (ref 15–46)
IRON SERPL-MCNC: 109 UG/DL (ref 35–180)
LYMPHOCYTES # BLD AUTO: 1.4 10E9/L (ref 0.8–5.3)
LYMPHOCYTES NFR BLD AUTO: 34.9 %
MCH RBC QN AUTO: 29.7 PG (ref 26.5–33)
MCHC RBC AUTO-ENTMCNC: 33 G/DL (ref 31.5–36.5)
MCV RBC AUTO: 90 FL (ref 78–100)
MONOCYTES # BLD AUTO: 0.3 10E9/L (ref 0–1.3)
MONOCYTES NFR BLD AUTO: 8 %
NEUTROPHILS # BLD AUTO: 2.2 10E9/L (ref 1.6–8.3)
NEUTROPHILS NFR BLD AUTO: 52.4 %
PLATELET # BLD AUTO: 72 10E9/L (ref 150–450)
POTASSIUM SERPL-SCNC: 3.8 MMOL/L (ref 3.4–5.3)
PROT SERPL-MCNC: 8 G/DL (ref 6.8–8.8)
RBC # BLD AUTO: 4.68 10E12/L (ref 4.4–5.9)
RETICS # AUTO: 76.3 10E9/L (ref 25–95)
RETICS/RBC NFR AUTO: 1.6 % (ref 0.5–2)
SODIUM SERPL-SCNC: 140 MMOL/L (ref 133–144)
TIBC SERPL-MCNC: 268 UG/DL (ref 240–430)
VIT B12 SERPL-MCNC: 494 PG/ML (ref 193–986)
WBC # BLD AUTO: 4.1 10E9/L (ref 4–11)

## 2018-06-22 PROCEDURE — 85025 COMPLETE CBC W/AUTO DIFF WBC: CPT | Performed by: INTERNAL MEDICINE

## 2018-06-22 PROCEDURE — 86704 HEP B CORE ANTIBODY TOTAL: CPT | Performed by: INTERNAL MEDICINE

## 2018-06-22 PROCEDURE — 86706 HEP B SURFACE ANTIBODY: CPT | Performed by: INTERNAL MEDICINE

## 2018-06-22 PROCEDURE — 82607 VITAMIN B-12: CPT | Performed by: INTERNAL MEDICINE

## 2018-06-22 PROCEDURE — 83540 ASSAY OF IRON: CPT | Performed by: INTERNAL MEDICINE

## 2018-06-22 PROCEDURE — 82746 ASSAY OF FOLIC ACID SERUM: CPT | Performed by: INTERNAL MEDICINE

## 2018-06-22 PROCEDURE — 80053 COMPREHEN METABOLIC PANEL: CPT | Performed by: INTERNAL MEDICINE

## 2018-06-22 PROCEDURE — 83550 IRON BINDING TEST: CPT | Performed by: INTERNAL MEDICINE

## 2018-06-22 PROCEDURE — 87340 HEPATITIS B SURFACE AG IA: CPT | Performed by: INTERNAL MEDICINE

## 2018-06-22 PROCEDURE — 36415 COLL VENOUS BLD VENIPUNCTURE: CPT | Performed by: INTERNAL MEDICINE

## 2018-06-22 PROCEDURE — 99205 OFFICE O/P NEW HI 60 MIN: CPT | Performed by: INTERNAL MEDICINE

## 2018-06-22 PROCEDURE — 85045 AUTOMATED RETICULOCYTE COUNT: CPT | Performed by: INTERNAL MEDICINE

## 2018-06-22 PROCEDURE — 85060 BLOOD SMEAR INTERPRETATION: CPT | Performed by: INTERNAL MEDICINE

## 2018-06-22 ASSESSMENT — PAIN SCALES - GENERAL: PAINLEVEL: NO PAIN (0)

## 2018-06-22 NOTE — PATIENT INSTRUCTIONS
Labs today    Try to get records from [place]    Referral to Hepatology      Preventive Care:    Colorectal Cancer Screening: During our visit today, we discussed that it is recommended you receive colorectal cancer screening. Please call or make an appointment with your primary care provider to discuss this. You may also call the [facility] scheduling line ([phone]) to set up a colonoscopy appointment.

## 2018-06-22 NOTE — LETTER
6/22/2018         RE: Truong Oreilly  [id number] [address] [place] [id number]        Dear Colleague,    Thank you for referring your patient, Truong Oreilly, to the [facility]. Please see a copy of my visit note below.    Hematology initial visit:  Date on this visit: 6/22/2018    Truong Oreilly  is referred by Dr.Libin Kulkarni for a hematology consultation. He requires evaluation for pancytopenia    Primary Physician: Jackelyn Kulkarni     History Of Present Illness:  Mr. Oreilly is a 53 year old male who was diagnosed with alcohol-related cirrhosis in 2000. He has had esophageal varices but denies variceal bleeding. He had ascites around the time of his diagnosis but has had no issues since then. He denies developing hepatic encephalopathy. He tells me that most of his workup was done at [facility] and I do not have those records with me.  He recently was seen by a dentist and he needs a tooth extraction and as he was found to have thrombocytopenia he was told to get clearance prior to getting the tooth extraction.  He has portal hypertension and splenomegaly  Looking back he has had chronic pancytopenia since 2011 and over time this has been pretty stable.  He tells me he had a tooth extracted about 3 years ago and he did not have significant bleeding after that. He denies any spontaneous bleeding or bruising. When he cuts himself then he usually clots well. He denies any pain. He denies any nausea and vomiting diarrhea or constipation. No melena or hematochezia. No weight changes. No new swellings. Off and on he has had mild lower extremity swelling. He denies infections. No trouble breathing. Energy is good and he remains fully functional. No neuropathy. No new skin problems. He is not on any medications for liver cirrhosis. He tells me that when he was diagnosed in 2000 at that time he was drinking on average 12 beers a week and some hard liquor for more than 1-1/2 decades but then he  quit after that and he has not had alcohol for about 18 years now. He does not have a hepatologist with whom he follows.      ROS:  A comprehensive ROS was otherwise neg      Past Medical/Surgical History:  Past Medical History:   Diagnosis Date     Cirrhosis (H)      Pancreatitis     pancytopenia  No past surgical history on file.  Allergies:  Allergies as of 06/22/2018 - Review Complete 06/22/2018   Allergen Reaction Noted     Penicillins  02/14/2014     Current Medications:  No current outpatient prescriptions on file.    he is not on any medications    Family History:  No family history on file.  Mom told her that she has thin blood and bruise easily. She was an alcoholic but as far as he knows, she did not have liver problems.  No cancers   No biological children    Social History:  Social History     Social History     Marital status:      Spouse name: N/A     Number of children: N/A     Years of education: N/A     Occupational History     Not on file.     Social History Main Topics     Smoking status: Never Smoker     Smokeless tobacco: Never Used     Alcohol use No     Drug use: No     Sexual activity: No     Other Topics Concern     Not on file     Social History Narrative   quit smoking 2 years ago. Previous etoh use but now has not used for 18 years or so  Physical Exam:  /71  Pulse 59  Temp 98.1  F (36.7  C)  Resp 16  Ht 1.829 m (6')  Wt 112.5 kg (248 lb)  SpO2 97%  BMI 33.63 kg/m2  CONSTITUTIONAL: no acute distress  EYES: PERRLA, no palor or icterus.   ENT/MOUTH: no mouth lesions. Ears normal  CVS: s1s2 no m r g .   RESPIRATORY: clear to auscultation b/l  GI: soft non tender no hepatosplenomegaly  NEURO: AAOX3  Grossly non focal neuro exam  INTEGUMENT: no obvious rashes. He has prominent veins on the lower part of chest and upper abdomen  LYMPHATIC: no palpable cervical, supraclavicular, axillary or inguinal LAD  MUSCULOSKELETAL: Unremarkable. No bony tenderness.   EXTREMITIES: Trace  edema bilaterally  PSYCH: Mentation, mood and affect are normal. Decision making capacity is intact  Laboratory/Imaging Studies  Reviewed      ASSESSMENT/PLAN:    Thrombocytopenia and leukopenia. These are chronic and most likely related to underlying alcoholic cirrhosis and portal hypertension and splenomegaly.  I would like to check peripheral blood smear and repeat comprehensive metabolic panel and check vitamin B12 and folate levels. I will also check hepatitis B studies. Previous hepatitis C was negative.     Anemia. This is mild and often on and likely related to anemia of chronic disease. In addition to above we will also check iron studies.    Alcohol-related cirrhosis. I strongly encouraged him to follow with a hepatologist and he agrees so I gave him a referral to see one  We will also try to get records from [facility].    Discussion regarding getting clearance for tooth extraction. We discussed that if the above-mentioned workup does not reveal any surprising findings and his counts remain stable then it is reasonable to proceed with the tooth extraction as planned without any further active intervention. She had a tooth extraction 3 years ago when his blood counts were about the same and he did not have issues with bleeding. Usually in patients who have thrombocytopenia due to splenomegaly and portal hypertension, the spleen is stores a lot of platelets and releases them at the time of need so usually the hemostasis is maintained well. I told him that if he develops excessive bleeding after the procedure then he should go to the emergency room but I do not believe that he needs prophylactic platelet transfusions.    Going forward he can follow with his primary care physician and hepatologist and see me back on an as needed basis    Justino Morin            Again, thank you for allowing me to participate in the care of your patient.        Sincerely,        Justino Morin MD

## 2018-06-22 NOTE — PROGRESS NOTES
Hematology initial visit:  Date on this visit: 6/22/2018    Truong Oreilly  is referred by Dr.Libin Kulkarni for a hematology consultation. He requires evaluation for pancytopenia    Primary Physician: Jackelyn Kulkarni     History Of Present Illness:  Mr. Oreilly is a 53 year old male who was diagnosed with alcohol-related cirrhosis in 2000. He has had esophageal varices but denies variceal bleeding. He had ascites around the time of his diagnosis but has had no issues since then. He denies developing hepatic encephalopathy. He tells me that most of his workup was done at [facility] and I do not have those records with me.  He recently was seen by a dentist and he needs a tooth extraction and as he was found to have thrombocytopenia he was told to get clearance prior to getting the tooth extraction.  He has portal hypertension and splenomegaly  Looking back he has had chronic pancytopenia since 2011 and over time this has been pretty stable.  He tells me he had a tooth extracted about 3 years ago and he did not have significant bleeding after that. He denies any spontaneous bleeding or bruising. When he cuts himself then he usually clots well. He denies any pain. He denies any nausea and vomiting diarrhea or constipation. No melena or hematochezia. No weight changes. No new swellings. Off and on he has had mild lower extremity swelling. He denies infections. No trouble breathing. Energy is good and he remains fully functional. No neuropathy. No new skin problems. He is not on any medications for liver cirrhosis. He tells me that when he was diagnosed in 2000 at that time he was drinking on average 12 beers a week and some hard liquor for more than 1-1/2 decades but then he quit after that and he has not had alcohol for about 18 years now. He does not have a hepatologist with whom he follows.      ROS:  A comprehensive ROS was otherwise neg      Past Medical/Surgical History:  Past Medical History:   Diagnosis Date      Cirrhosis (H)      Pancreatitis     pancytopenia  No past surgical history on file.  Allergies:  Allergies as of 06/22/2018 - Review Complete 06/22/2018   Allergen Reaction Noted     Penicillins  02/14/2014     Current Medications:  No current outpatient prescriptions on file.    he is not on any medications    Family History:  No family history on file.  Mom told her that she has thin blood and bruise easily. She was an alcoholic but as far as he knows, she did not have liver problems.  No cancers   No biological children    Social History:  Social History     Social History     Marital status:      Spouse name: N/A     Number of children: N/A     Years of education: N/A     Occupational History     Not on file.     Social History Main Topics     Smoking status: Never Smoker     Smokeless tobacco: Never Used     Alcohol use No     Drug use: No     Sexual activity: No     Other Topics Concern     Not on file     Social History Narrative   quit smoking 2 years ago. Previous etoh use but now has not used for 18 years or so  Physical Exam:  /71  Pulse 59  Temp 98.1  F (36.7  C)  Resp 16  Ht 1.829 m (6')  Wt 112.5 kg (248 lb)  SpO2 97%  BMI 33.63 kg/m2  CONSTITUTIONAL: no acute distress  EYES: PERRLA, no palor or icterus.   ENT/MOUTH: no mouth lesions. Ears normal  CVS: s1s2 no m r g .   RESPIRATORY: clear to auscultation b/l  GI: soft non tender no hepatosplenomegaly  NEURO: AAOX3  Grossly non focal neuro exam  INTEGUMENT: no obvious rashes. He has prominent veins on the lower part of chest and upper abdomen  LYMPHATIC: no palpable cervical, supraclavicular, axillary or inguinal LAD  MUSCULOSKELETAL: Unremarkable. No bony tenderness.   EXTREMITIES: Trace edema bilaterally  PSYCH: Mentation, mood and affect are normal. Decision making capacity is intact  Laboratory/Imaging Studies  Reviewed      ASSESSMENT/PLAN:    Thrombocytopenia and leukopenia. These are chronic and most likely related to  underlying alcoholic cirrhosis and portal hypertension and splenomegaly.  I would like to check peripheral blood smear and repeat comprehensive metabolic panel and check vitamin B12 and folate levels. I will also check hepatitis B studies. Previous hepatitis C was negative.     Anemia. This is mild and often on and likely related to anemia of chronic disease. In addition to above we will also check iron studies.    Alcohol-related cirrhosis. I strongly encouraged him to follow with a hepatologist and he agrees so I gave him a referral to see one  We will also try to get records from [facility].    Discussion regarding getting clearance for tooth extraction. We discussed that if the above-mentioned workup does not reveal any surprising findings and his counts remain stable then it is reasonable to proceed with the tooth extraction as planned without any further active intervention. She had a tooth extraction 3 years ago when his blood counts were about the same and he did not have issues with bleeding. Usually in patients who have thrombocytopenia due to splenomegaly and portal hypertension, the spleen is stores a lot of platelets and releases them at the time of need so usually the hemostasis is maintained well. I told him that if he develops excessive bleeding after the procedure then he should go to the emergency room but I do not believe that he needs prophylactic platelet transfusions.    Going forward he can follow with his primary care physician and hepatologist and see me back on an as needed basis    Justino Morin

## 2018-06-22 NOTE — MR AVS SNAPSHOT
After Visit Summary   6/22/2018    Truong Oreilly    MRN: [id number]           Patient Information     Date Of Birth          1965        Visit Information        Provider Department      6/22/2018 11:45 AM Justino Morin MD [facility]        Today's Diagnoses     Pancytopenia (H)    -  1    Alcoholic cirrhosis of liver without ascites (H)          Care Instructions    Labs today    Try to get records from [facility]    Referral to Hepatology      Preventive Care:    Colorectal Cancer Screening: During our visit today, we discussed that it is recommended you receive colorectal cancer screening. Please call or make an appointment with your primary care provider to discuss this. You may also call the [facility] scheduling line ([phone]) to set up a colonoscopy appointment.              Follow-ups after your visit        Additional Services     GASTROENTEROLOGY ADULT REF CONSULT ONLY       Preferred Location: [facility]: [phone]  Refer to Hepatology      Please be aware that coverage of these services is subject to the terms and limitations of your health insurance plan.  Call member services at your health plan with any benefit or coverage questions.  Any procedures must be performed at a [place] facility OR coordinated by your clinic's referral office.    Please bring the following with you to your appointment:    (1) Any X-Rays, CTs or MRIs which have been performed.  Contact the facility where they were done to arrange for  prior to your scheduled appointment.    (2) List of current medications   (3) This referral request   (4) Any documents/labs given to you for this referral                  Your next 10 appointments already scheduled     Jul 03, 2018  9:30 AM CDT   Lab with  LAB   [facility] ([facility])    [address] [address] [phone]            Jul 03, 2018 10:30 AM CDT    (Arrive by 10:15 AM)   [facility] General Liver with Kimberly Grant MD   [facility] Hepatology ([facility])    [address]  [address]  [facility] [id number] [phone]            Aug 20, 2018  2:00 PM CDT   Lab with YANELIS LAB   [facility] ([facility])    [address]  1[address] Floor  [facility] [id number] [phone]            Aug 20, 2018  3:00 PM CDT   (Arrive by 2:45 PM)   [facility] General Liver with Blaise De La Garza MD   [facility] Hepatology ([facility])    [address]  [address]  [facility] [id number] [phone]              Who to contact     If you have questions or need follow up information about today's clinic visit or your schedule please contact [facility] directly at [phone].  Normal or non-critical lab and imaging results will be communicated to you by MyChart, letter or phone within 4 business days after the clinic has received the results. If you do not hear from us within 7 days, please contact the clinic through [employer] or phone. If you have a critical or abnormal lab result, we will notify you by phone as soon as possible.  Submit refill requests through [employer] or call your pharmacy and they will forward the refill request to us. Please allow 3 business days for your refill to be completed.          Additional Information About Your Visit        Care EveryWhere ID     This is your Care EveryWhere ID. This could be used by other organizations to access your [place] medical records  [id number]        Your Vitals Were     Pulse Temperature Respirations Height Pulse Oximetry BMI (Body Mass Index)    59 98.1  F (36.7  C) 16 1.829 m (6') 97% 33.63 kg/m2       Blood Pressure from Last 3 Encounters:   06/22/18 112/71   11/29/17 104/74   10/11/16 118/77    Weight from Last 3 Encounters:   06/22/18 112.5 kg (248 lb)   11/29/17 112.5 kg (248 lb)   10/11/16 112 kg (247  lb)              We Performed the Following     Blood Morphology Pathologist Review     CBC with platelets differential     Comprehensive metabolic panel     Folate     GASTROENTEROLOGY ADULT REF CONSULT ONLY     Hepatitis B core antibody     Hepatitis B Surface Antibody     Hepatitis B surface antigen     Iron and iron binding capacity     Reticulocyte Count     Vitamin B12        Primary Care Provider Office Phone # Fax #    Jackelyn Kulkarni MD PhD [phone]       [address]  [facility] [address]        Equal Access to Services     [facility] : Hadii aad ku hadasho Soomaali, waaxda luqadaha, qaybta kaalmada adeegyada, waxay idiin hayaan adeeg kharash la'aan . So [facility] [phone].    ATENCIÓN: Si habla español, tiene a sanchez disposición servicios gratuitos de asistencia lingüística. Llame al [phone].    We comply with applicable federal civil rights laws and [place] laws. We do not discriminate on the basis of race, color, national origin, age, disability, sex, sexual orientation, or gender identity.            Thank you!     Thank you for choosing [facility]  for your care. Our goal is always to provide you with excellent care. Hearing back from our patients is one way we can continue to improve our services. Please take a few minutes to complete the written survey that you may receive in the mail after your visit with us. Thank you!             Your Updated Medication List - Protect others around you: Learn how to safely use, store and throw away your medicines at www.disposemymeds.org.      Notice  As of 6/22/2018 11:59 PM    You have not been prescribed any medications.

## 2018-06-22 NOTE — NURSING NOTE
Oncology Rooming Note    June 22, 2018 11:53 AM   Truong Oreilly is a 53 year old male who presents for:    Chief Complaint   Patient presents with     Oncology Clinic Visit     thrombocytopenia     Initial Vitals: /71  Pulse 59  Temp 98.1  F (36.7  C)  Resp 16  Ht 1.829 m (6')  Wt 112.5 kg (248 lb)  SpO2 97%  BMI 33.63 kg/m2 Estimated body mass index is 33.63 kg/(m^2) as calculated from the following:    Height as of this encounter: 1.829 m (6').    Weight as of this encounter: 112.5 kg (248 lb). Body surface area is 2.39 meters squared.  No Pain (0) Comment: Data Unavailable   No LMP for male patient.  Allergies reviewed: Yes  Medications reviewed: Yes    Medications: Medication refills not needed today.  Pharmacy name entered into [facility]: [id number] [place] [address]        5 minutes for nursing intake (face to face time)     Elizabeth Storey LPN

## 2018-06-25 ENCOUNTER — CARE COORDINATION (OUTPATIENT)
Dept: ONCOLOGY | Facility: CLINIC | Age: 53
End: 2018-06-25

## 2018-06-25 LAB
COPATH REPORT: NORMAL
HBV CORE AB SERPL QL IA: NONREACTIVE
HBV SURFACE AB SERPL IA-ACNC: 0 M[IU]/ML
HBV SURFACE AG SERPL QL IA: NONREACTIVE

## 2018-06-25 NOTE — PROGRESS NOTES
JANIE was faxed to [facility] Medical Records to obtain prior records from 2000 when he was evaluated at [facility].

## 2018-07-16 DIAGNOSIS — K70.30 ALCOHOLIC CIRRHOSIS OF LIVER WITHOUT ASCITES (H): Primary | ICD-10-CM

## 2018-08-16 ENCOUNTER — PRE VISIT (OUTPATIENT)
Dept: GASTROENTEROLOGY | Facility: CLINIC | Age: 53
End: 2018-08-16

## 2018-08-16 RX ORDER — IBUPROFEN 200 MG
600-800 TABLET ORAL EVERY 4 HOURS PRN
COMMUNITY

## 2018-08-16 NOTE — PROGRESS NOTES
Was the patient contacted by phone and reminded of the upcoming visit? Yes    Was the patient instructed to bring a current list of all medications to the appointment or instructed to bring in all medication bottles? Med Rec completed during pre visit call    Were outside records requested? Yes (include name, phone number and fax number) [facility]    Where are the outside records located? [facility] to fax records, [facility]    Was the patient instructed to arrive prior to the appointment time to have ordered labs drawn? Yes, patient verbalized understanding    Were the needed lab orders placed? Yes    Eileen Falk [facility]  8/16/2018 4:34 PM

## 2018-11-28 ENCOUNTER — DOCUMENTATION ONLY (OUTPATIENT)
Dept: LAB | Facility: CLINIC | Age: 53
End: 2018-11-28

## 2018-11-28 DIAGNOSIS — D61.818 PANCYTOPENIA (H): Primary | ICD-10-CM

## 2018-11-28 DIAGNOSIS — E78.5 HYPERLIPIDEMIA WITH TARGET LDL LESS THAN 130: ICD-10-CM

## 2018-11-28 DIAGNOSIS — D69.6 THROMBOCYTOPENIA (H): ICD-10-CM

## 2018-11-28 DIAGNOSIS — D68.9 COAGULOPATHY (H): ICD-10-CM

## 2018-11-28 DIAGNOSIS — Z00.00 ENCOUNTER FOR ROUTINE ADULT HEALTH EXAMINATION WITHOUT ABNORMAL FINDINGS: ICD-10-CM

## 2018-11-28 NOTE — PROGRESS NOTES
Pending lab orders for 11/30/18. Please review, sign, and close encounter. Thank you. Lashon Ledesma RN

## 2018-11-30 ENCOUNTER — OFFICE VISIT (OUTPATIENT)
Dept: PEDIATRICS | Facility: CLINIC | Age: 53
End: 2018-11-30
Payer: COMMERCIAL

## 2018-11-30 VITALS
OXYGEN SATURATION: 98 % | HEART RATE: 63 BPM | HEIGHT: 72 IN | BODY MASS INDEX: 33.82 KG/M2 | SYSTOLIC BLOOD PRESSURE: 119 MMHG | WEIGHT: 249.7 LBS | TEMPERATURE: 96.8 F | DIASTOLIC BLOOD PRESSURE: 60 MMHG

## 2018-11-30 DIAGNOSIS — D69.6 THROMBOCYTOPENIA (H): ICD-10-CM

## 2018-11-30 DIAGNOSIS — Z00.00 ROUTINE HISTORY AND PHYSICAL EXAMINATION OF ADULT: Primary | ICD-10-CM

## 2018-11-30 DIAGNOSIS — D68.9 COAGULOPATHY (H): ICD-10-CM

## 2018-11-30 DIAGNOSIS — Z11.4 SCREENING FOR HIV (HUMAN IMMUNODEFICIENCY VIRUS): ICD-10-CM

## 2018-11-30 DIAGNOSIS — K70.30 ALCOHOLIC CIRRHOSIS OF LIVER WITHOUT ASCITES (H): ICD-10-CM

## 2018-11-30 DIAGNOSIS — D61.818 PANCYTOPENIA (H): ICD-10-CM

## 2018-11-30 DIAGNOSIS — Z12.11 SPECIAL SCREENING FOR MALIGNANT NEOPLASMS, COLON: ICD-10-CM

## 2018-11-30 DIAGNOSIS — E78.5 HYPERLIPIDEMIA LDL GOAL <160: ICD-10-CM

## 2018-11-30 DIAGNOSIS — E78.5 HYPERLIPIDEMIA WITH TARGET LDL LESS THAN 130: ICD-10-CM

## 2018-11-30 DIAGNOSIS — Z00.00 ENCOUNTER FOR ROUTINE ADULT HEALTH EXAMINATION WITHOUT ABNORMAL FINDINGS: ICD-10-CM

## 2018-11-30 LAB
AFP SERPL-MCNC: <1.5 UG/L (ref 0–8)
ANION GAP SERPL CALCULATED.3IONS-SCNC: 6 MMOL/L (ref 3–14)
BUN SERPL-MCNC: 13 MG/DL (ref 7–30)
CALCIUM SERPL-MCNC: 8.9 MG/DL (ref 8.5–10.1)
CHLORIDE SERPL-SCNC: 109 MMOL/L (ref 94–109)
CHOLEST SERPL-MCNC: 207 MG/DL
CO2 SERPL-SCNC: 26 MMOL/L (ref 20–32)
CREAT SERPL-MCNC: 0.92 MG/DL (ref 0.66–1.25)
ERYTHROCYTE [DISTWIDTH] IN BLOOD BY AUTOMATED COUNT: 13.2 % (ref 10–15)
FERRITIN SERPL-MCNC: 256 NG/ML (ref 26–388)
GFR SERPL CREATININE-BSD FRML MDRD: 86 ML/MIN/1.7M2
GLUCOSE SERPL-MCNC: 89 MG/DL (ref 70–99)
HCT VFR BLD AUTO: 41.1 % (ref 40–53)
HDLC SERPL-MCNC: 32 MG/DL
HGB BLD-MCNC: 13.7 G/DL (ref 13.3–17.7)
HIV 1+2 AB+HIV1 P24 AG SERPL QL IA: NONREACTIVE
IRON SATN MFR SERPL: 26 % (ref 15–46)
IRON SERPL-MCNC: 66 UG/DL (ref 35–180)
LDLC SERPL CALC-MCNC: 151 MG/DL
MCH RBC QN AUTO: 29.9 PG (ref 26.5–33)
MCHC RBC AUTO-ENTMCNC: 33.3 G/DL (ref 31.5–36.5)
MCV RBC AUTO: 90 FL (ref 78–100)
NONHDLC SERPL-MCNC: 175 MG/DL
PLATELET # BLD AUTO: 72 10E9/L (ref 150–450)
POTASSIUM SERPL-SCNC: 3.8 MMOL/L (ref 3.4–5.3)
PSA SERPL-ACNC: 0.38 UG/L (ref 0–4)
RBC # BLD AUTO: 4.58 10E12/L (ref 4.4–5.9)
SODIUM SERPL-SCNC: 141 MMOL/L (ref 133–144)
TIBC SERPL-MCNC: 255 UG/DL (ref 240–430)
TRIGL SERPL-MCNC: 122 MG/DL
WBC # BLD AUTO: 4 10E9/L (ref 4–11)

## 2018-11-30 PROCEDURE — 87389 HIV-1 AG W/HIV-1&-2 AB AG IA: CPT | Performed by: INTERNAL MEDICINE

## 2018-11-30 PROCEDURE — G0103 PSA SCREENING: HCPCS | Performed by: INTERNAL MEDICINE

## 2018-11-30 PROCEDURE — 80061 LIPID PANEL: CPT | Performed by: INTERNAL MEDICINE

## 2018-11-30 PROCEDURE — 85027 COMPLETE CBC AUTOMATED: CPT | Performed by: INTERNAL MEDICINE

## 2018-11-30 PROCEDURE — 36415 COLL VENOUS BLD VENIPUNCTURE: CPT | Performed by: INTERNAL MEDICINE

## 2018-11-30 PROCEDURE — 80048 BASIC METABOLIC PNL TOTAL CA: CPT | Performed by: INTERNAL MEDICINE

## 2018-11-30 PROCEDURE — 82728 ASSAY OF FERRITIN: CPT | Performed by: INTERNAL MEDICINE

## 2018-11-30 PROCEDURE — 83550 IRON BINDING TEST: CPT | Performed by: INTERNAL MEDICINE

## 2018-11-30 PROCEDURE — 99396 PREV VISIT EST AGE 40-64: CPT | Performed by: INTERNAL MEDICINE

## 2018-11-30 PROCEDURE — 82105 ALPHA-FETOPROTEIN SERUM: CPT | Performed by: INTERNAL MEDICINE

## 2018-11-30 PROCEDURE — 83540 ASSAY OF IRON: CPT | Performed by: INTERNAL MEDICINE

## 2018-11-30 NOTE — NURSING NOTE
Chief Complaint   Patient presents with     Physical     INGRID       Initial /60 (BP Location: Right arm, Patient Position: Sitting, Cuff Size: Adult Regular)  Pulse 63  Temp 96.8  F (36  C) (Temporal)  Ht 6' (1.829 m)  Wt 249 lb 11.2 oz (113.3 kg)  SpO2 98%  BMI 33.87 kg/m2 Estimated body mass index is 33.87 kg/(m^2) as calculated from the following:    Height as of this encounter: 6' (1.829 m).    Weight as of this encounter: 249 lb 11.2 oz (113.3 kg).  Medication Reconciliation: complete      DANITA Hernandez      
5.5    17.63 )-----------( 299      ( 28 Nov 2017 18:05 )             17.6    11-28    136  |  95<L>  |  12  ----------------------------<  104<H>  4.0   |  26  |  0.63    Ca    8.5      28 Nov 2017 18:05  Phos  3.7     11-28  Mg     1.1     11-28    TPro  7.4  /  Alb  2.7<L>  /  TBili  1.1  /  DBili  x   /  AST  12  /  ALT  6   /  AlkPhos  56  11-28    PT/INR - ( 28 Nov 2017 22:00 )   PT: 20.4 SEC;   INR: 1.80          PTT - ( 28 Nov 2017 22:00 )  PTT:29.0 SE    < from: Xray Chest 1 View AP/PA (11.28.17 @ 20:32) >    INTERPRETATION:  Clear lungs. Prominent skin fold over the left lower lung    < end of copied text >    EKG: atrial fibrillation, no acute findings

## 2018-11-30 NOTE — MR AVS SNAPSHOT
After Visit Summary   11/30/2018    Truong Oreilly    MRN: [id number]           Patient Information     Date Of Birth          1965        Visit Information        Provider Department      11/30/2018 8:10 AM Jackelyn Kulkarni MD PhD [facility]        Today's Diagnoses     Routine history and physical examination of adult    -  1    Screening for HIV (human immunodeficiency virus)        Special screening for malignant neoplasms, colon        Thrombocytopenia (H)          Care Instructions    Make appointment(s) for:   -- colonoscopy    Call the [facility] to schedule appointment with Dr. De La Garza      Check with your insurance regarding coverage for Shingrix (RZV) - the new shingles vaccine.         Fats in the Diet, Good and Bad:   Recommendations for a Healthy Diet   Are all fats bad?   Not all fat is bad. You need to have some fat in your diet for good health because:     Fat provides calories, which give you energy.     Fat is used by your body to make hormonelike substances that control blood pressure and other heart functions.     Fat helps the body absorb some nutrients, such as vitamins A, D, E, and K. Certain antioxidants are also absorbed much better if fat is present. (Antioxidants help keep the body's cells healthy.)     Some fats found in plant oils and fish can help prevent chronic disease.   In addition, fats and oils add flavor, aroma, and texture to food, helping the food taste good.   Most fats are found in meat, poultry, fish, dairy products, plant oils, and packaged convenience and snack foods.   One problem with all fats (even the healthy fats) is that they are very high in calories. Fats have 9 calories per gram of fat while carbohydrates or protein have 4 calories per gram. Eating more calories than your body can use causes weight gain. Weight gain increases your risk for developing health problems. These health problems include high blood pressure, high  cholesterol, type 2 diabetes, heart disease, stroke, cancer, gallstones, and gout.   Which fats are the bad fats?   Saturated and trans fats are the more harmful fats. You should not get more than 10% of your total daily calories from saturated fat. You should eat as little trans fat as possible.     Saturated fats are mainly in animal products such as meats; poultry (mostly in dark meat and skin); lard, and whole and low-fat dairy products, including milk, cheese, ice cream, butter, and sour cream. Eating too much saturated fat can raise your cholesterol level and increase your risk of heart disease. Meals high in saturated fat can also cause sudden increases in triglycerides and other blood fats. This, in turn, decreases blood flow through the arteries and heart.     Trans fats can be found naturally in some animal products, but most of the trans fats in our diet are manufactured from polyunsaturated oils. The manufacturing process is called hydrogenation. It is done to keep fat from going rancid and to change the form of the fat from a liquid to a solid. Hydrogenated fats are used in stick margarine, some soft margarines, shortening, packaged convenience foods, and many commercially baked or fried foods and fast foods such as cakes, cookies, chips, popcorn, French fries, and ice cream. Hydrogenated fats (trans fats) may be even more dangerous for the heart than naturally occurring saturated fats and may increase the risk of some cancers. Food manufacturers must list the amount of trans fat and saturated fat on the Nutrition Facts label of packaged foods.     Tropical oils (palm, coconut, and cocoa butter) are also high in saturated fat, but it is not known if these fats have the same harmful effect on the heart as the saturated fat in animal products. Some tropical oils are less saturated than others (for example, palm fruit oil contains only half as much saturated fat as palm kernel oil). For now, eating as  little saturated fat as possible is still the general dietary recommendation.   What is cholesterol?   Cholesterol is a fatty substance that has both good and bad effects on the body. Your body uses cholesterol to make hormones and to build and maintain nerve cells. However, when your body has too much cholesterol, deposits of fat called plaque form inside blood vessel walls. The blood vessel walls thicken and the vessels become narrow--a condition called atherosclerosis. This change in the blood vessels reduces blood flow through the blood vessels and can lead to heart attacks or strokes.   You can get cholesterol by eating animal products such as meat, eggs, and dairy products. Your liver also makes cholesterol from other nutrients you eat (fats, carbohydrates, and proteins).   Eating a diet low in saturated fat, trans fat, and cholesterol can help lower high blood cholesterol and improve the blood flow through your arteries. Eating less of these harmful fats and getting regular exercise will help decrease your risk of heart attack and stroke. It will also help you keep a healthy weight or lose weight if you are overweight.   Which fats are good fats?   Polyunsaturated and monounsaturated fats are good, or beneficial, fats and oils. Some of these fats are considered essential, meaning that you need them for good health. Polyunsaturated fats are found mostly in fish and safflower, corn, soybean, sunflower, and cottonseed oils. Monounsaturated fats are found mainly in canola, olive, and peanut oils, as well as most nuts.   Recently a lot of attention has been given to some of the fatty acids that make up poly and monounsaturated fats. Three important fatty acids are called omega-3, omega-6, and omega-9.     Omega-3 fatty acids are found in fish and some plants. They are good for heart health. They may reduce the risk of stroke, high blood pressure, and other chronic disease. Good sources are oily fish such as  salmon, mackerel and tuna. You can also buy fish oil supplements, but you should check with your healthcare provider before taking these supplements. Fish oil supplements may cause bleeding in some people, especially if they are taken with blood-thinning medicines. Good plant sources for omega-3 fatty acids are canola oil, soybeans, flaxseed, avocado, and some types of nuts, especially walnuts and almonds.     Omega-6 fatty acid is found in corn, safflower, soybean, and sunflower oils.     Omega-9 fatty acid is found in olive oil, canola oil, and avocados.   Getting some of these good fats is healthful, but many Americans eat too much and become overweight. It is likely that the balance of fatty acids is very important. The American diet typically contains too much omega-6 fatty acid and not enough omega-3 fatty acid.   How much fat do I need in my diet?   The current American Heart Association Diet and Lifestyle Recommendations are:     Get no more than 25 to 35% of your total calories from fat.     Get no more than 7% of your calories from saturated fat. For example, if you eat 2000 calories a day, you should eat no more than 15 grams (g) of saturated fat.     Don't eat trans fats at all or limit them to less than 1% of your calories.     Eat less than 300 milligrams (mg) of cholesterol a day. If you have heart disease, you should eat less than 200 mg a day.   How can I cut down on the fat in my diet?   You can cut down on the fat in your diet by eating fewer high-fat animal products, such as red meat, poultry with skin, whole-milk dairy products, and fried foods. Even healthy fats, such as oils, nuts, seeds, and avocado, are high in calories and should be eaten in limited amounts. Eat more fruits, vegetables, and whole grains. Start thinking about eating less fat when you shop for groceries. Try to follow these suggestions:     Read food labels.     Choose sour cream, cream cheese, cheese, yogurt, and milk  products that are nonfat or low fat.     Cook with canola or olive oil instead of butter and margarine. Choose fats and oils that contain less than 2 g of saturated fat per tablespoon. Always watch your portion size because all fats are high in calories.     Buy only lean cuts of meat, such as poultry breast without skin; pork tenderloin; flank, round or sirloin beef; and low-sodium ham.     Cook lean. Bake, broil, grill, steam, microwave, and sauté foods instead of frying them.     Have a meatless dinner a few times a week. Beans are a great alternative to meat.     Use low-fat or fat-free salad dressings. Try a flavored vinegar on your salad. It contains no fat and can have lots of flavor.     Try to have cookies and desserts only as a special treat, not every day. Prepare baked desserts at home, using healthy oils, egg whites, and fruit purees for sweetening.     Steam vegetables with herbs in the microwave, or sauté them in a small amount of healthy oil or cooking spray, instead of cooking them with butter.     Avoid trans fats by choosing fewer packaged convenience foods and checking labels for saturated fat and trans fat content.     Eat fish, especially fatty fish, at least 2 times a week (not fried).     Fast food can be very high in total fat, saturated fat, and trans fat. Try not to eat a lot of it. Choose grilled chicken or a salad with fat-free or low-fat dressing. Ask for nutrition information brochures from fast-food restaurants so that you can choose wisely.     For a healthy snack, choose fresh fruits or yogurt instead of high-fat fried snacks or sweets.   Not all fat is bad, but it can be unhealthy if you eat too much. Become aware of the amounts and kinds of fat in your diet. Reducing the fat in your diet can be your first step to a healthier diet and a healthier you.   Developed by [employer].   Published by [employer].   Last modified: 2009-02-04   Last reviewed: 2008-12-12   This content is  reviewed periodically and is subject to change as new health information becomes available. The information is intended to inform and educate and is not a replacement for medical evaluation, advice, diagnosis or treatment by a healthcare professional.   Senior Health Advisor 2009.1 Index  Senior Health Advisor 2009.1 Credits             Preventive Health Recommendations  Male Ages 50 - 64    Yearly exam:             See your health care provider every year in order to  o   Review health changes.   o   Discuss preventive care.    o   Review your medicines if your doctor has prescribed any.     Have a cholesterol test every 5 years, or more frequently if you are at risk for high cholesterol/heart disease.     Have a diabetes test (fasting glucose) every three years. If you are at risk for diabetes, you should have this test more often.     Have a colonoscopy at age 50, or have a yearly FIT test (stool test). These exams will check for colon cancer.      Talk with your health care provider about whether or not a prostate cancer screening test (PSA) is right for you.    You should be tested each year for STDs (sexually transmitted diseases), if you re at risk.     Shots: Get a flu shot each year. Get a tetanus shot every 10 years.     Nutrition:    Eat at least 5 servings of fruits and vegetables daily.     Eat whole-grain bread, whole-wheat pasta and brown rice instead of white grains and rice.     Get adequate Calcium and Vitamin D.     Lifestyle    Exercise for at least 150 minutes a week (30 minutes a day, 5 days a week). This will help you control your weight and prevent disease.     Limit alcohol to one drink per day.     No smoking.     Wear sunscreen to prevent skin cancer.     See your dentist every six months for an exam and cleaning.     See your eye doctor every 1 to 2 years.            Follow-ups after your visit        Additional Services     GASTROENTEROLOGY ADULT REF PROCEDURE ONLY [facility] ([phone])  [phone]; No Preference - GI Provider Only       Last Lab Result: Creatinine (mg/dL)       Date                     Value                 11/30/2018               0.92             ----------  Body mass index is 33.87 kg/(m^2).     Needed:  No  Language:  English    Patient will be contacted to schedule procedure.     Please be aware that coverage of these services is subject to the terms and limitations of your health insurance plan.  Call member services at your health plan with any benefit or coverage questions.  Any procedures must be performed at a [place] facility OR coordinated by your clinic's referral office.    Please bring the following with you to your appointment:    (1) Any X-Rays, CTs or MRIs which have been performed.  Contact the facility where they were done to arrange for  prior to your scheduled appointment.    (2) List of current medications   (3) This referral request   (4) Any documents/labs given to you for this referral                  Who to contact     If you have questions or need follow up information about today's clinic visit or your schedule please contact [facility] directly at [phone].  Normal or non-critical lab and imaging results will be communicated to you by [employer], letter or phone within 4 business days after the clinic has received the results. If you do not hear from us within 7 days, please contact the clinic through [employer] or phone. If you have a critical or abnormal lab result, we will notify you by phone as soon as possible.  Submit refill requests through [facility] EHR or call your pharmacy and they will forward the refill request to us. Please allow 3 business days for your refill to be completed.          Additional Information About Your Visit        [facility] EHR Information     [facility] EHR is an electronic gateway that provides easy, online access to your medical records. With [facility] EHR, you can request a clinic appointment, read your test  results, renew a prescription or communicate with your care team.     To sign up for MyChart visit the website at [email or web address]   You will be asked to enter the access code listed below, as well as some personal information. Please follow the directions to create your username and password.     Your access code is: [email or web address]  Expires: 2019  8:26 AM     Your access code will  in 90 days. If you need help or a new code, please contact your [facility] or call [phone] for assistance.        Care EveryWhere ID     This is your Care EveryWhere ID. This could be used by other organizations to access your [facility] medical records  [id number]        Your Vitals Were     Pulse Temperature Height Pulse Oximetry BMI (Body Mass Index)       63 96.8  F (36  C) (Temporal) 6' (1.829 m) 98% 33.87 kg/m2        Blood Pressure from Last 3 Encounters:   18 119/60   18 112/71   17 104/74    Weight from Last 3 Encounters:   18 249 lb 11.2 oz (113.3 kg)   18 248 lb (112.5 kg)   17 248 lb (112.5 kg)              We Performed the Following     GASTROENTEROLOGY ADULT REF PROCEDURE ONLY [facility] [phone]; No Preference - GI Provider Only     HIV Antigen Antibody Combo        Primary Care Provider Office Phone # Fax #    Jackelyn Kulkarni MD PhD [phone]       [address]  [place] [address]        Equal Access to Services     [facility]: Hadii aad ku hadasho Soomaali, waaxda luqadaha, qaybta kaalmada adekeli, nathaniel medellin . So [facility] [phone].    ATENCIÓN: Si habla español, tiene a sanchez disposición servicios gratuitos de asistencia lingüística. Llame al [phone].    We comply with applicable federal civil rights laws and [place] laws. We do not discriminate on the basis of race, color, national origin, age, disability, sex, sexual orientation, or gender identity.             Thank you!     Thank you for choosing [facility]  for your care. Our goal is always to provide you with excellent care. Hearing back from our patients is one way we can continue to improve our services. Please take a few minutes to complete the written survey that you may receive in the mail after your visit with us. Thank you!             Your Updated Medication List - Protect others around you: Learn how to safely use, store and throw away your medicines at www.disposemymeds.org.          This list is accurate as of 11/30/18  8:26 AM.  Always use your most recent med list.                   Brand Name Dispense Instructions for use Diagnosis    ADVIL 200 MG tablet   Generic drug:  ibuprofen      Take 600-800 mg by mouth every 4 hours as needed for mild pain

## 2018-11-30 NOTE — LETTER
December 17, 2018      Truong Oreilly  [address] [address] [place] [id number]              Dear Truong,    Your recent result are within acceptable range or at baseline. Please continue with your current plan of care.       Please call or make an appointment if you have further questions.     Jackelyn Kulkarni MD-PhD     Results for orders placed or performed in visit on 11/30/18   HIV Antigen Antibody Combo   Result Value Ref Range    HIV Antigen Antibody Combo Nonreactive NR^Nonreactive

## 2018-11-30 NOTE — PROGRESS NOTES
Dear Truong,   Here is a copy of your test results that we reviewed at your recent clinic visit. Please continue the plan of care during the visit and follow up as planned.     Please call or make an appointment if you have further questions.     Jackelyn Kulkarni MD-PhD

## 2018-11-30 NOTE — PATIENT INSTRUCTIONS
Make appointment(s) for:   -- colonoscopy    Call the [facility] to schedule appointment with Dr. De La Garza      Check with your insurance regarding coverage for Shingrix (RZV) - the new shingles vaccine.         Fats in the Diet, Good and Bad:   Recommendations for a Healthy Diet   Are all fats bad?   Not all fat is bad. You need to have some fat in your diet for good health because:     Fat provides calories, which give you energy.     Fat is used by your body to make hormonelike substances that control blood pressure and other heart functions.     Fat helps the body absorb some nutrients, such as vitamins A, D, E, and K. Certain antioxidants are also absorbed much better if fat is present. (Antioxidants help keep the body's cells healthy.)     Some fats found in plant oils and fish can help prevent chronic disease.   In addition, fats and oils add flavor, aroma, and texture to food, helping the food taste good.   Most fats are found in meat, poultry, fish, dairy products, plant oils, and packaged convenience and snack foods.   One problem with all fats (even the healthy fats) is that they are very high in calories. Fats have 9 calories per gram of fat while carbohydrates or protein have 4 calories per gram. Eating more calories than your body can use causes weight gain. Weight gain increases your risk for developing health problems. These health problems include high blood pressure, high cholesterol, type 2 diabetes, heart disease, stroke, cancer, gallstones, and gout.   Which fats are the bad fats?   Saturated and trans fats are the more harmful fats. You should not get more than 10% of your total daily calories from saturated fat. You should eat as little trans fat as possible.     Saturated fats are mainly in animal products such as meats; poultry (mostly in dark meat and skin); lard, and whole and low-fat dairy products, including milk, cheese, ice cream, butter, and sour cream. Eating too much saturated fat can  raise your cholesterol level and increase your risk of heart disease. Meals high in saturated fat can also cause sudden increases in triglycerides and other blood fats. This, in turn, decreases blood flow through the arteries and heart.     Trans fats can be found naturally in some animal products, but most of the trans fats in our diet are manufactured from polyunsaturated oils. The manufacturing process is called hydrogenation. It is done to keep fat from going rancid and to change the form of the fat from a liquid to a solid. Hydrogenated fats are used in stick margarine, some soft margarines, shortening, packaged convenience foods, and many commercially baked or fried foods and fast foods such as cakes, cookies, chips, popcorn, French fries, and ice cream. Hydrogenated fats (trans fats) may be even more dangerous for the heart than naturally occurring saturated fats and may increase the risk of some cancers. Food manufacturers must list the amount of trans fat and saturated fat on the Nutrition Facts label of packaged foods.     Tropical oils (palm, coconut, and cocoa butter) are also high in saturated fat, but it is not known if these fats have the same harmful effect on the heart as the saturated fat in animal products. Some tropical oils are less saturated than others (for example, palm fruit oil contains only half as much saturated fat as palm kernel oil). For now, eating as little saturated fat as possible is still the general dietary recommendation.   What is cholesterol?   Cholesterol is a fatty substance that has both good and bad effects on the body. Your body uses cholesterol to make hormones and to build and maintain nerve cells. However, when your body has too much cholesterol, deposits of fat called plaque form inside blood vessel walls. The blood vessel walls thicken and the vessels become narrow--a condition called atherosclerosis. This change in the blood vessels reduces blood flow through the  blood vessels and can lead to heart attacks or strokes.   You can get cholesterol by eating animal products such as meat, eggs, and dairy products. Your liver also makes cholesterol from other nutrients you eat (fats, carbohydrates, and proteins).   Eating a diet low in saturated fat, trans fat, and cholesterol can help lower high blood cholesterol and improve the blood flow through your arteries. Eating less of these harmful fats and getting regular exercise will help decrease your risk of heart attack and stroke. It will also help you keep a healthy weight or lose weight if you are overweight.   Which fats are good fats?   Polyunsaturated and monounsaturated fats are good, or beneficial, fats and oils. Some of these fats are considered essential, meaning that you need them for good health. Polyunsaturated fats are found mostly in fish and safflower, corn, soybean, sunflower, and cottonseed oils. Monounsaturated fats are found mainly in canola, olive, and peanut oils, as well as most nuts.   Recently a lot of attention has been given to some of the fatty acids that make up poly and monounsaturated fats. Three important fatty acids are called omega-3, omega-6, and omega-9.     Omega-3 fatty acids are found in fish and some plants. They are good for heart health. They may reduce the risk of stroke, high blood pressure, and other chronic disease. Good sources are oily fish such as salmon, mackerel and tuna. You can also buy fish oil supplements, but you should check with your healthcare provider before taking these supplements. Fish oil supplements may cause bleeding in some people, especially if they are taken with blood-thinning medicines. Good plant sources for omega-3 fatty acids are canola oil, soybeans, flaxseed, avocado, and some types of nuts, especially walnuts and almonds.     Omega-6 fatty acid is found in corn, safflower, soybean, and sunflower oils.     Omega-9 fatty acid is found in olive oil, canola  oil, and avocados.   Getting some of these good fats is healthful, but many Americans eat too much and become overweight. It is likely that the balance of fatty acids is very important. The American diet typically contains too much omega-6 fatty acid and not enough omega-3 fatty acid.   How much fat do I need in my diet?   The current American Heart Association Diet and Lifestyle Recommendations are:     Get no more than 25 to 35% of your total calories from fat.     Get no more than 7% of your calories from saturated fat. For example, if you eat 2000 calories a day, you should eat no more than 15 grams (g) of saturated fat.     Don't eat trans fats at all or limit them to less than 1% of your calories.     Eat less than 300 milligrams (mg) of cholesterol a day. If you have heart disease, you should eat less than 200 mg a day.   How can I cut down on the fat in my diet?   You can cut down on the fat in your diet by eating fewer high-fat animal products, such as red meat, poultry with skin, whole-milk dairy products, and fried foods. Even healthy fats, such as oils, nuts, seeds, and avocado, are high in calories and should be eaten in limited amounts. Eat more fruits, vegetables, and whole grains. Start thinking about eating less fat when you shop for groceries. Try to follow these suggestions:     Read food labels.     Choose sour cream, cream cheese, cheese, yogurt, and milk products that are nonfat or low fat.     Cook with canola or olive oil instead of butter and margarine. Choose fats and oils that contain less than 2 g of saturated fat per tablespoon. Always watch your portion size because all fats are high in calories.     Buy only lean cuts of meat, such as poultry breast without skin; pork tenderloin; flank, round or sirloin beef; and low-sodium ham.     Cook lean. Bake, broil, grill, steam, microwave, and sauté foods instead of frying them.     Have a meatless dinner a few times a week. Beans are a great  alternative to meat.     Use low-fat or fat-free salad dressings. Try a flavored vinegar on your salad. It contains no fat and can have lots of flavor.     Try to have cookies and desserts only as a special treat, not every day. Prepare baked desserts at home, using healthy oils, egg whites, and fruit purees for sweetening.     Steam vegetables with herbs in the microwave, or sauté them in a small amount of healthy oil or cooking spray, instead of cooking them with butter.     Avoid trans fats by choosing fewer packaged convenience foods and checking labels for saturated fat and trans fat content.     Eat fish, especially fatty fish, at least 2 times a week (not fried).     Fast food can be very high in total fat, saturated fat, and trans fat. Try not to eat a lot of it. Choose grilled chicken or a salad with fat-free or low-fat dressing. Ask for nutrition information brochures from fast-food restaurants so that you can choose wisely.     For a healthy snack, choose fresh fruits or yogurt instead of high-fat fried snacks or sweets.   Not all fat is bad, but it can be unhealthy if you eat too much. Become aware of the amounts and kinds of fat in your diet. Reducing the fat in your diet can be your first step to a healthier diet and a healthier you.   Developed by [employer].   Published by [employer].   Last modified: 2009-02-04   Last reviewed: 2008-12-12   This content is reviewed periodically and is subject to change as new health information becomes available. The information is intended to inform and educate and is not a replacement for medical evaluation, advice, diagnosis or treatment by a healthcare professional.   Senior Health Advisor 2009.1 Index  Senior Health Advisor 2009.1 Credits             Preventive Health Recommendations  Male Ages 50 - 64    Yearly exam:             See your health care provider every year in order to  o   Review health changes.   o   Discuss preventive care.    o   Review your  medicines if your doctor has prescribed any.     Have a cholesterol test every 5 years, or more frequently if you are at risk for high cholesterol/heart disease.     Have a diabetes test (fasting glucose) every three years. If you are at risk for diabetes, you should have this test more often.     Have a colonoscopy at age 50, or have a yearly FIT test (stool test). These exams will check for colon cancer.      Talk with your health care provider about whether or not a prostate cancer screening test (PSA) is right for you.    You should be tested each year for STDs (sexually transmitted diseases), if you re at risk.     Shots: Get a flu shot each year. Get a tetanus shot every 10 years.     Nutrition:    Eat at least 5 servings of fruits and vegetables daily.     Eat whole-grain bread, whole-wheat pasta and brown rice instead of white grains and rice.     Get adequate Calcium and Vitamin D.     Lifestyle    Exercise for at least 150 minutes a week (30 minutes a day, 5 days a week). This will help you control your weight and prevent disease.     Limit alcohol to one drink per day.     No smoking.     Wear sunscreen to prevent skin cancer.     See your dentist every six months for an exam and cleaning.     See your eye doctor every 1 to 2 years.

## 2018-11-30 NOTE — PROGRESS NOTES
SUBJECTIVE:   CC: Truong Oreilly is an 53 year old male who presents for preventive health visit.     HPI:  History of liver cirrhosis. Missed appt with hepatology a few months ago. Has not rescheduled. Has been abstinent from alcohol.   Known chronic thrombocytopenia.     Healthy Habits:    Do you get at least three servings of calcium containing foods daily (dairy, green leafy vegetables, etc.)? yes    Amount of exercise or daily activities, outside of work: 5-6 day(s) per week, active at work    Problems taking medications regularly not applicable    Medication side effects: No    Have you had an eye exam in the past two years? yes    Do you see a dentist twice per year? yes    Do you have sleep apnea, excessive snoring or daytime drowsiness?no      -------------------------------------    Today's PHQ-2 Score:   PHQ-2 ( 1999 Pfizer) 11/30/2018 10/11/2016   Q1: Little interest or pleasure in doing things 0 0   Q2: Feeling down, depressed or hopeless 0 0   PHQ-2 Score 0 0       Abuse: Current or Past(Physical, Sexual or Emotional)- No  Do you feel safe in your environment? Yes    Social History   Substance Use Topics     Smoking status: Never Smoker     Smokeless tobacco: Never Used     Alcohol use No     If you drink alcohol do you typically have >3 drinks per day or >7 drinks per week? No                      Last PSA:   PSA   Date Value Ref Range Status   11/30/2018 0.38 0 - 4 ug/L Final     Comment:     Assay Method:  Chemiluminescence using Siemens Vista analyzer       Reviewed orders with patient. Reviewed health maintenance and updated orders accordingly - Yes  Labs reviewed in EPIC    Reviewed and updated as needed this visit by clinical staff  Tobacco  Allergies  Meds  Med Hx  Surg Hx  Fam Hx  Soc Hx        Reviewed and updated as needed this visit by Provider            ROS:  CONSTITUTIONAL: NEGATIVE for fever, chills, change in weight  INTEGUMENTARY/SKIN: NEGATIVE for worrisome rashes, moles or  lesions  EYES: NEGATIVE for vision changes or irritation  ENT: NEGATIVE for ear, mouth and throat problems  RESP: NEGATIVE for significant cough or SOB  CV: NEGATIVE for chest pain, palpitations or peripheral edema  GI: NEGATIVE for nausea, abdominal pain, heartburn, or change in bowel habits   male: negative for dysuria, hematuria, decreased urinary stream, erectile dysfunction, urethral discharge  MUSCULOSKELETAL: NEGATIVE for significant arthralgias or myalgia  NEURO: NEGATIVE for weakness, dizziness or paresthesias  PSYCHIATRIC: NEGATIVE for changes in mood or affect    OBJECTIVE:   /60 (BP Location: Right arm, Patient Position: Sitting, Cuff Size: Adult Regular)  Pulse 63  Temp 96.8  F (36  C) (Temporal)  Ht 6' (1.829 m)  Wt 249 lb 11.2 oz (113.3 kg)  SpO2 98%  BMI 33.87 kg/m2  EXAM:  GENERAL: healthy, alert and no distress  EYES: Eyes grossly normal to inspection, PERRL and conjunctivae and sclerae normal  HENT: ear canals and TM's normal, nose and mouth without ulcers or lesions  NECK: no adenopathy, no asymmetry, masses, or scars and thyroid normal to palpation  RESP: lungs clear to auscultation - no rales, rhonchi or wheezes  CV: regular rate and rhythm, normal S1 S2, no S3 or S4, no murmur, click or rub, no peripheral edema and peripheral pulses strong  ABDOMEN: soft, nontender, no hepatosplenomegaly, no masses and bowel sounds normal  MS: no gross musculoskeletal defects noted, no edema  SKIN: no suspicious lesions or rashes  NEURO: Normal strength and tone, mentation intact and speech normal  PSYCH: mentation appears normal, affect normal/bright    Diagnostic Test Results:  Results for orders placed or performed in visit on 11/30/18 (from the past 24 hour(s))   Lipid panel reflex to direct LDL Fasting   Result Value Ref Range    Cholesterol 207 (H) <200 mg/dL    Triglycerides 122 <150 mg/dL    HDL Cholesterol 32 (L) >39 mg/dL    LDL Cholesterol Calculated 151 (H) <100 mg/dL    Non HDL  Cholesterol 175 (H) <130 mg/dL   **Basic metabolic panel FUTURE 1yr   Result Value Ref Range    Sodium 141 133 - 144 mmol/L    Potassium 3.8 3.4 - 5.3 mmol/L    Chloride 109 94 - 109 mmol/L    Carbon Dioxide 26 20 - 32 mmol/L    Anion Gap 6 3 - 14 mmol/L    Glucose 89 70 - 99 mg/dL    Urea Nitrogen 13 7 - 30 mg/dL    Creatinine 0.92 0.66 - 1.25 mg/dL    GFR Estimate 86 >60 mL/min/1.7m2    GFR Estimate If Black >90 >60 mL/min/1.7m2    Calcium 8.9 8.5 - 10.1 mg/dL   **CBC with platelets FUTURE 1yr   Result Value Ref Range    WBC 4.0 4.0 - 11.0 10e9/L    RBC Count 4.58 4.4 - 5.9 10e12/L    Hemoglobin 13.7 13.3 - 17.7 g/dL    Hematocrit 41.1 40.0 - 53.0 %    MCV 90 78 - 100 fl    MCH 29.9 26.5 - 33.0 pg    MCHC 33.3 31.5 - 36.5 g/dL    RDW 13.2 10.0 - 15.0 %    Platelet Count 72 (L) 150 - 450 10e9/L   **Iron and iron binding capacity FUTURE anytime   Result Value Ref Range    Iron 66 35 - 180 ug/dL    Iron Binding Cap 255 240 - 430 ug/dL    Iron Saturation Index 26 15 - 46 %   Ferritin   Result Value Ref Range    Ferritin 256 26 - 388 ng/mL   **Prostate spec antigen screen FUTURE 1yr   Result Value Ref Range    PSA 0.38 0 - 4 ug/L       ASSESSMENT/PLAN:       ICD-10-CM    1. Routine history and physical examination of adult Z00.00    2. Screening for HIV (human immunodeficiency virus) Z11.4 HIV Antigen Antibody Combo   3. Special screening for malignant neoplasms, colon Z12.11 GASTROENTEROLOGY ADULT REF PROCEDURE ONLY [facility] [phone]; No Preference - GI Provider Only   4. Thrombocytopenia (H) D69.6    5. Alcoholic cirrhosis of liver without ascites (H) K70.30    6. Hyperlipidemia LDL goal <160 E78.5      -- hyperlipidemia: low fat low cholesterol diet given.     COUNSELING:  Reviewed preventive health counseling, as reflected in patient instructions    BP Readings from Last 1 Encounters:   11/30/18 119/60     Estimated body mass index is 33.87 kg/(m^2) as calculated from the following:    Height as of  this encounter: 6' (1.829 m).    Weight as of this encounter: 249 lb 11.2 oz (113.3 kg).      Weight management plan: low fat low cholesterol diet and encouraged weight loss     reports that he has never smoked. He has never used smokeless tobacco.      Counseling Resources:  ATP IV Guidelines  Pooled Cohorts Equation Calculator  FRAX Risk Assessment  ICSI Preventive Guidelines  Dietary Guidelines for Americans, 2010  USDA's MyPlate  ASA Prophylaxis  Lung CA Screening    Jackelyn Kulkarni MD PhD  [facility]

## 2018-11-30 NOTE — PROGRESS NOTES
Send result with the following comment:  Dear Truong,   Your recent result are within acceptable range or at baseline. Please continue with your current plan of care.       Please call or make an appointment if you have further questions.     Jackelyn Kulkarni MD-PhD

## 2019-01-10 ENCOUNTER — SURGERY (OUTPATIENT)
Age: 54
End: 2019-01-10
Payer: COMMERCIAL

## 2019-01-10 ENCOUNTER — HOSPITAL ENCOUNTER (OUTPATIENT)
Facility: AMBULATORY SURGERY CENTER | Age: 54
Discharge: HOME | End: 2019-01-10
Attending: INTERNAL MEDICINE | Admitting: INTERNAL MEDICINE
Payer: COMMERCIAL

## 2019-01-10 VITALS
OXYGEN SATURATION: 96 % | RESPIRATION RATE: 16 BRPM | DIASTOLIC BLOOD PRESSURE: 88 MMHG | TEMPERATURE: 97.6 F | SYSTOLIC BLOOD PRESSURE: 124 MMHG

## 2019-01-10 LAB — COLONOSCOPY: NORMAL

## 2019-01-10 PROCEDURE — G8907 PT DOC NO EVENTS ON DISCHARG: HCPCS

## 2019-01-10 PROCEDURE — 45385 COLONOSCOPY W/LESION REMOVAL: CPT | Mod: 33 | Performed by: INTERNAL MEDICINE

## 2019-01-10 PROCEDURE — 88305 TISSUE EXAM BY PATHOLOGIST: CPT | Performed by: INTERNAL MEDICINE

## 2019-01-10 PROCEDURE — G8918 PT W/O PREOP ORDER IV AB PRO: HCPCS

## 2019-01-10 PROCEDURE — 45385 COLONOSCOPY W/LESION REMOVAL: CPT

## 2019-01-10 RX ORDER — LIDOCAINE 40 MG/G
CREAM TOPICAL
Status: DISCONTINUED | OUTPATIENT
Start: 2019-01-10 | End: 2019-01-11 | Stop reason: HOSPADM

## 2019-01-10 RX ORDER — FENTANYL CITRATE 50 UG/ML
INJECTION, SOLUTION INTRAMUSCULAR; INTRAVENOUS PRN
Status: DISCONTINUED | OUTPATIENT
Start: 2019-01-10 | End: 2019-01-10 | Stop reason: HOSPADM

## 2019-01-10 RX ORDER — ONDANSETRON 2 MG/ML
4 INJECTION INTRAMUSCULAR; INTRAVENOUS
Status: DISCONTINUED | OUTPATIENT
Start: 2019-01-10 | End: 2019-01-11 | Stop reason: HOSPADM

## 2019-01-10 RX ADMIN — FENTANYL CITRATE 50 MCG: 50 INJECTION, SOLUTION INTRAMUSCULAR; INTRAVENOUS at 09:38

## 2019-01-10 RX ADMIN — FENTANYL CITRATE 50 MCG: 50 INJECTION, SOLUTION INTRAMUSCULAR; INTRAVENOUS at 09:42

## 2019-01-14 LAB — COPATH REPORT: NORMAL

## 2020-04-27 ENCOUNTER — TELEPHONE (OUTPATIENT)
Dept: PEDIATRICS | Facility: CLINIC | Age: 55
End: 2020-04-27

## 2020-04-27 NOTE — LETTER
April 28, 2020      Truong Oreilly  [address] [address] [place] [id number]              To Whom It May Concern,    Truong Oreilly's wife has been tested positive for COVID-19. He is currently asymptomatic. I recommend he gets tested for COVID-19.     Should you have any questions, please feel free to contact me at the address above.            Sincerely,          Jackelyn Kulkarni MD PhD/Electronically signed.

## 2020-04-27 NOTE — TELEPHONE ENCOUNTER
[facility] Call Center    Phone Message    May a detailed message be left on voicemail: yes     Reason for Call: Patient called stating that his wife is a healthcare worker and they went to the drive up testing and she tested positive for COVID. Patient was advised to get a letter for Dr. Kulkarni so he is able to be tested. Please advise. Thank you.    Action Taken: Message routed to:  Primary Care p [phone]    Travel Screening: Not Applicable

## 2020-04-28 ENCOUNTER — VIRTUAL VISIT (OUTPATIENT)
Dept: PEDIATRICS | Facility: CLINIC | Age: 55
End: 2020-04-28
Payer: COMMERCIAL

## 2020-04-28 DIAGNOSIS — Z20.822 CLOSE EXPOSURE TO COVID-19 VIRUS: Primary | ICD-10-CM

## 2020-04-28 DIAGNOSIS — R68.89 FLU-LIKE SYMPTOMS: ICD-10-CM

## 2020-04-28 PROCEDURE — 99213 OFFICE O/P EST LOW 20 MIN: CPT | Mod: 95 | Performed by: FAMILY MEDICINE

## 2020-04-28 NOTE — TELEPHONE ENCOUNTER
Let him know that I wrote a letter for him, electronically signed.He shouldn't need a letter to get tested. He can go to the following website and find a location to get tested. Does he have any symptoms? I will put that in the letter.     [email or web address]

## 2020-04-28 NOTE — PATIENT INSTRUCTIONS
If you were tested, we will call you with your COVID-19 result. You don't need to call us to check on your result. You can also use the information at the end of this document to sign up for [facility] [employer] where you can get your results and a message about those results sent to you through the [employer]ated [employer] application.    Regardless of if you have been tested or not:  Patient who have symptoms (cough, fever, or shortness of breath), need to isolate for 7 days from when symptoms started AND 72 hours after fever resolves (without fever reducing medications) AND improvement of respiratory symptoms (whichever is longer).      Isolate yourself at home (in own room/own bathroom if possible)    Do Not allow any visitors    Do Not go to work or school    Do Not go to [employer],  centers, shopping, or other public places.    Do Not shake hands.    Avoid close and intimate contact with others (hugging, kissing).    Follow CDC recommendations for household cleaning of frequently touched services.     After the initial 7 days, continue to isolate yourself from household members as much as possible. To continue decrease the risk of community spread and exposure, you and any members of your household should limit activities in public for 14 days after starting home isolation.     You can reference the following CDC link for helpful home isolation/care tips:  https://www.cdc.gov/coronavirus/2019-ncov/downloads/10Things.pdf    Protect Others:    Cover Your Mouth and Nose with a mask, disposable tissue or wash cloth to avoid spreading germs to others.    Wash your hands and face frequently with soap and water    Call Back If: Breathing difficulty develops or you become worse.    For more information about COVID19 and options for caring for yourself at home, please visit the CDC website at https://www.cdc.gov/coronavirus/2019-ncov/about/steps-when-sick.html  For more options for care at [facility], please visit  "our website at [email or web address]    For more information, please use the [place] Department of Health COVID-19 Website: https://www.health.[facility].[place]/diseases/coronavirus/index.html  [place] Department of Health ([facility]) COVID-19 Hotlines (Interpreters available):      Health questions: Phone Number: [phone] or 1-846.773.2275 and Hours: 7 a.m. to 7 p.m.    Schools and  questions: Phone Number: [phone] or [phone] and Hours 7 a.m. to 7 p.m.    Patient Education     Coronavirus Disease 2019 (COVID-19): Prevention  The best prevention is to not have contact with the SARS-CoV-2 virus. There is no vaccine yet.  Canceling travel and other outings  Stay informed about COVID-19 in your area. Follow local instructions about being in public. Be aware of events in your community that may be postponed or canceled, such as school and sporting events. You may be advised not to attend public gatherings.   You will be advised to stay at least 6 feet from others as much as possible. This is called \"social distancing.\" You may be advised to stay at home and isolate yourself as much as possible if COVID-19 is in your area. You may hear terms such as \"self isolate, \"quarantine,\"  stay at home,   shelter in place,  and  lockdown.   The CDC advises that people should not travel to areas where there are COVID-19 outbreaks right now for any reason that is not urgent. For the most current CDC travel advisories, visit the CDC website at www.cdc.gov/coronavirus/2019-ncov/travelers. Don t go on cruises or do non-essential travel right now.   When you are at home    Wash your hands often. Use soap and clean, running water for at least 20 seconds.    If you don't have access to soap and water, use an alcohol-based hand  often. Make sure it has at least 60% alcohol.    Don't touch your eyes, nose, or mouth unless you have clean hands.    Don t kiss someone who is sick.    If you " "need to cough or sneeze, do it into a tissue. Then throw the tissue into the trash. If you don't have tissues, cough or sneeze into the bend of your elbow.    When possible, don't touch \"high-touch\" shared surfaces such as doorknobs and handles, cabinet handles, and light switches.    Clean frequently-touched home surfaces often with disinfectant. This includes desk surfaces, printers, phones, kitchen counters, tables, fridge door handle, bathroom surfaces, and any soiled surface. Closely follow disinfectant label instructions. Go to the CDC s detailed cleaning website at www.cdc.gov/coronavirus/2019-ncov/prepare/cleaning-disinfection.html.    Check your home supplies. Consider keeping a 2-week supply of medicines, food, and other needed household items.    Make a plan for childcare, work, and ways to stay in touch with others. Know who will help you if you get sick.    Don't be around people who are sick.    There is no evidence right now that animals spread SARS-CoV-2. But it's always a good idea to wash your hands after touching any animals. Don't touch animals that may be sick.    Don t share eating or drinking utensils with sick people.    If you leave home    Stay at least 6 feet away from all people.    When possible, don't touch \"high-touch\" public surfaces such as doorknobs and handles, cabinet handles, and light switches. If you touch these surfaces, try to clean them first with a disinfecting wipe. Or touch them using a tissue or paper towel.    Use an alcohol-based hand  often. Make sure it has at least 60% alcohol.    Don't touch your eyes, nose, or mouth unless you have clean hands.    If you need to cough or sneeze, do it into a tissue. Then throw the tissue into the trash. If you don't have tissues, cough or sneeze into the bend of your elbow.    Avoid public gatherings.    The CDC advises wearing a cloth face mask in public. During a public health emergency, medical face masks may be " "reserved for healthcare workers. You may need to make a cloth face mask of your own. You can do this using a bandana, T-shirt, or other cloth. The [facility] has instructions on how to make a mask.    If you are at a work site    Stay at least 6 feet away from all people.    Don't shake hands with anyone.    Don t have in-person meetings. Meet over phone or video.    Wash your hands often. Use soap and clean, running water for at least 20 seconds.    If you don't have access to soap and water, use an alcohol-based hand  often. Make sure it has at least 60% alcohol.    Don't touch your eyes, nose, or mouth unless you have clean hands.    The CDC advises wearing a cloth face mask in public. During a public health emergency, medical face masks may be reserved for healthcare workers. You may need to make a cloth face mask of your own. You can do this using a bandana, T-shirt, or other cloth. The CDC has instructions on how to make a mask.    When possible, don't touch \"high-touch\" public surfaces such as doorknobs and handles, cabinet handles, and light switches. If you touch these surfaces, clean them first with a disinfecting wipe. Or touch them using a tissue or paper towel.    Use office sharron one person at a time.    Consider not having office coffee or tea, or group foods.    Don t have meals in groups.    Clean work surfaces often with disinfectant. This includes desk surfaces, photocopier, printer, phones, kitchen counters, fridge door handle, bathroom surfaces, and others.    Don t touch other people s personal work tools, such as phones, keyboards, pens, and other items.    Don t touch other people s eating or drinking utensils.    If you need to cough or sneeze, do it into a tissue. Then throw the tissue into the trash. If you don't have tissues, cough or sneeze into the bend of your elbow.    If you feel sick in any way, go home and stay home.    If you have been exposed to a person with COVID-19  If " "you've been exposed within that last 14 days to someone who is suspected of having COVID-19 or has tested positive for it:    Call your healthcare provider and follow all instructions. Your activities and where you go likely will be restricted for up to 2 weeks. Check your community's instructions about activity restrictions. You may be directed to stay home, or \"self-isolate.\"    Take your temperature every morning and evening for at least 14 days. This is to check for fever. Keep a record of the readings.    Watch for symptoms of the virus. Call your provider if you have symptoms. Call your provider first before going to any clinic or hospital. See the CDC's symptom .    Stay home if you are sick for any reason.  When to call your healthcare provider  Call your healthcare provider if think you have COVID-19 symptoms. These can include fever, cough, and trouble breathing. They may also include body aches, sore throat, or diarrhea. Don t go to a healthcare facility before speaking to a healthcare provider.   Last modified date: 4/6/2020  [employer] last reviewed this educational content on 4/1/2020 2000-2020 [employer]. [address] [place] [address]. All rights reserved. This information is not intended as a substitute for professional medical care. Always follow your healthcare professional's instructions.           Patient Education     Coronavirus Disease 2019 (COVID-19): Caring for Yourself or Others  If you or a household member have symptoms of COVID-19, follow the guidelines below. This will help you manage symptoms and keep the virus from spreading.  If you have symptoms of COVID-19    Stay home and contact your care team. They will tell you what to do.    Don t panic. Keep in mind that other illnesses can cause similar symptoms.    Stay away from work, school, and public places.    Limit physical contact with others in your home. Limit visitors. No kissing.    Clean " surfaces you touch with disinfectant.    If you need to cough or sneeze, do it into a tissue. Then, throw the tissue into the trash. If you don't have tissues, cough or sneeze into the bend of your elbow    Don t share food or personal items with people in your household. This includes items like eating and drinking utensils, towels, and bedding.    Wear a cloth face mask around other people. You may need to make your own mask using a bandana, T-shirt, or other cloth. See the CDC s instructions on how to make a mask.    If you need to go to a hospital or clinic, call ahead to let them know. Expect the care team to wear masks, gowns, gloves, and eye protection. You may be put in a separate room.    Follow all instructions from your care team.  If you ve been diagnosed with COVID-19    Stay home and away from others, including other people in your home. (This is called self-isolation.) Don t leave home unless you need to get medical care. Don't go to work, school, or public places. Don't use buses, taxis, or other public transportation.    Follow all instructions from your care team.    If you need to go to a hospital or clinic, call ahead to let them know. Expect the care team to wear masks, gowns, gloves, and eye protection. You may be put in a separate room.    Wear a face mask to protect others from your germs. If you can t wear a mask, your caregivers should wear one. You may need to make your own mask using a bandana, T-shirt, or other cloth. See the CDC s instructions on how to make a mask.    Limit contact with pets and other animals.    Don t share food or personal items with people in your household. This includes items like eating and drinking utensils, towels, and bedding.    If you need to cough or sneeze, do it into a tissue. Then, throw the tissue into the trash. If you don't have tissues, cough or sneeze into the bend of your elbow.    Wash your hands often.  Self-care at home  At this time, there is  no medicine to prevent or treat COVID-19. Experts are testing different medicines, trying to find one that works.  So far, the only proven treatment is to support your body while it fights the virus.    Get plenty of rest.    Drink extra fluids--at least 6 to 8 glasses of liquid each day. Good choices are water, sports drinks, soft drinks (no caffeine), juice, tea, and soup.    Take over-the-counter (OTC) medicine to help reduce pain and fever. Your care team will tell you which OTC medicine to use.  If you ve been in the hospital for COVID-19, follow your care team s instructions. This may include changing positions to help your breathing (such as lying on your belly).  Caring for a sick person    Follow all instructions from the care team.    Wash your hands often.    Wear protective clothing as advised.    Make sure the sick person wears a mask. If they can't wear a mask, don't stay in the same room with them. If you must be in the same room, wear a face mask.    Keep track of the sick person s symptoms.    Clean surfaces often with disinfectant. This includes phones, kitchen counters, fridge door handles, bathroom surfaces, and others.    Don t let anyone share household items with the sick person. This includes eating and drinking tools, towels, sheets, and blankets.    Clean fabrics and laundry well.    Keep other people and pets away from the sick person.  When you can stop self-isolation  When you are sick with COVID-19, you should stay away from other people. This is called self-isolation. The rules for ending self-isolation depend on your health in general.  If you are normally healthy  You can stop self-isolation when all 3 of these are true:    You ve had no fever--and no medicine that reduces fever--for 3 full days (72 hours). And     Your symptoms are better, such as cough or trouble breathing. And     At least 7 days have passed since your symptoms first started.  Talk with your care team before you  leave home. They may tell you it s okay to leave, or they may give you different advice.  If you have a weak immune system  If you re being treated for cancer, have an immune disorder (such as HIV), or have had a transplant (organ or bone marrow), follow your care team s instructions. You may be able to end self-isolation when all 3 of these are true:    You ve had no fever--and no medicine that reduces fever--for 3 full days (72 hours). And     Your symptoms are better, such as cough or trouble breathing. And     You ve had 2 tests for COVID-19. The tests happened at least 24 hours apart, and both show that you don t have the virus. (If no tests are available, your care team may tell you to follow the rules for normally healthy people above.)  When to call your care team  Call your care team right away if a sick person has any of these:    Trouble breathing    Pain or pressure in the chest  If a sick person has any of these, call 911:    Trouble breathing that gets worse    Pain or pressure in the chest that gets worse    Blue tint to lips or face    Fast or irregular heartbeat    Confusion or trouble waking    Fainting or loss of consciousness    Coughing up blood    Sweating  Going home from the hospital  If you have COVID-19 and were recently in the hospital:    Follow the instructions above for self-care and isolation.    Follow the hospital care team s instructions.    Ask questions if anything is unclear to you. Write down answers so you remember them.  Last modified date: 4/24/2020  This information has been modified by your health care provider with permission from the publisher.      [id number] [facility], [address] [place] [address], [place] [address]. All rights reserved. This information is not intended as a substitute for professional medical care. Always follow your healthcare professional's instructions. This information has been modified by your health care provider with permission from the publisher.            Patient Education     Understanding COVID-19 (Coronavirus Disease 2019)  COVID-19 is an illness of the lungs. It causes fever, coughing and trouble breathing. The illness is caused by a new virus in the coronavirus family called SARS-CoV-2.  First seen in late 2019, this virus has spread to many cities and countries around the world. Scientists are working to understand it better.   For the latest information, visit the CDC website at www.cdc.gov/coronavirus/2019-ncov. Or call 755-MJP-ZXZA ([phone]).  How does COVID-19 spread?   The virus seems to spread and infect people fairly easily. It may spread by:    Breathing in droplets of fluid that someone coughs or sneezes into the air.    Touching your eyes, nose or mouth after touching an infected surface. (The virus can live on handles, objects, and other surfaces.)  What are the symptoms of COVID-19?  Some people have no symptoms or only mild symptoms. Symptoms may appear 2 to 14 days after contact with the virus. They include:    Fever    Coughing    Sore throat    Runny nose    Trouble breathing    Body aches    Fatigue (feeling very tired)    Loss of appetite    Nausea or vomiting (feeling sick to your stomach or throwing up)    Diarrhea (loose, watery poops)    Abdominal (belly) pain    Loss of smell and taste  You can check your symptoms with the [facility] Coronavirus Self-.  What are possible problems from COVID-19?  In many cases, this virus can cause infection (pneumonia) in both lungs. This can make a person very ill, and it can even cause death.  Your chances of severe illness are higher if:    You re an older adult    You have a serious health issue, such as heart or lung disease, diabetes or kidney disease    You have a health problem (or take a medicine) that suppresses the immune system  How is COVID-19 diagnosed?  Your care team will ask about your symptoms, recent travel and contact with sick people.  Not everyone will be tested for the  virus. If you need to be tested, we will use a cotton swab to take a sample of cells from your nose and throat.    Blood test    Sputum test (we may collect mucus that you have coughed up from your lungs)    Chest X-ray or CT scan  How is COVID-19 treated?  At this time, there is no medicine to prevent or treat COVID-19. Experts are testing different medicines, trying to find one that works.  So far, the only proven treatment is to support your body while it fights the virus.    Get extra rest to help your body fight the illness.    Drink extra fluids--drink 6 to 8 glasses of liquids each day. Good choices are water, sports drinks, soft drinks (no caffeine), juice, tea, and soup.    Take over-the-counter (OTC) medicine to reduce pain and fever. Your care team will tell you which medicine to use.  If you are very sick, you may need to stay in the hospital.    We may give you fluids through a vein to keep you hydrated (IV fluids).    To make sure your body gets enough oxygen, we may give you an oxygen mask or a breathing machine (ventilator).    We may turn you onto your stomach (called  prone positioning ). This will increase the oxygen in your lungs.  Are you at risk for COVID-19?  Some studies suggest that people without symptoms may spread COVID-19.  You re at risk for getting COVID-19 if:    You live in (or recently traveled to) an area with a COVID-19 outbreak.    You had close contact with someone who has or may have COVID-19. Close contact means being within 6 feet, living in the same house, or visiting.  Last modified date: 4/24/2020  This information has been modified by your health care provider with permission from the publisher.    [id number] [facility], [address] [place] [address], [place] [address]. All rights reserved. This information is not intended as a substitute for professional medical care. Always follow your healthcare professional's instructions. This information has been modified by your health care  provider with permission from the publisher.

## 2020-04-28 NOTE — PROGRESS NOTES
"Truong Oreilly is a 54 year old male who is being evaluated via a billable video visit.      The patient has been notified of following:     \"This video visit will be conducted via a call between you and your physician/provider. We have found that certain health care needs can be provided without the need for an in-person physical exam.  This service lets us provide the care you need with a video conversation.  If a prescription is necessary we can send it directly to your pharmacy.  If lab work is needed we can place an order for that and you can then stop by our lab to have the test done at a later time.    Video visits are billed at different rates depending on your insurance coverage.  Please reach out to your insurance provider with any questions.    If during the course of the call the physician/provider feels a video visit is not appropriate, you will not be charged for this service.\"    Patient has given verbal consent for Video visit? Yes    How would you like to obtain your AVS? Mychart (patient signing up today) Mail a copy also    Patient would like the video invitation sent by: Text to cell phone: [phone]    Will anyone else be joining your video visit? No    Subjective   Patient is here for a video visit instead of in person visit due to the current COVID-19 pandemic.    Truong Oreilly is a 54 year old male who presents to clinic today for the following health issues:  Patient is new to the provider, is here today for immediate visit with concerns of having mild nasal congestion, scratchy throat and a mild cough for the past 3 days.  His wife was tested positive for COVID-19 2 days ago after having symptoms for days.  She is a healthcare worker, working part-time at a group home and mental health therapist that she start seeing patient since March 18 due to the pandemic.  Patient is currently living with his wife and a pet dog.  He denies concerns for shortness of breath, wheezing, diarrhea, " abdominal pain, sore throat, abnormal skin rashes, decrease or lack of appetite, nausea, vomiting, problems with urination..  Patient is wondering if he can get a order for COVID-19 testing at an outside clinic.    HPI  Concern - Possible COVID19  Onset: 04/24/2020    Description:   Patient's states his wife recently tested positive for COVID19.  Patient states his illness symptoms started out with a scratchy throat (itch in his throat), Non-productive cough, felt feverish the next day (temp not taken),  Constant headache, states his lungs just don't feel right, can take deep breaths, chest feels heavy.  Terrible headache yesterday.   Today he is coughing up greenish/yellow phlegm, slight SOB, a light feeling of heaviness in his chest, just feels like something is off.  Slight headache.  Weakness, body aches and fatigue.     Intensity: mild    Progression of Symptoms:  Improving, seems to feel better as each day goes by.     Accompanying Signs & Symptoms:  none    Previous history of similar problem:   no    Precipitating factors:   Worsened by: nothing    Alleviating factors:  Improved by: Ibuprofen    Therapies Tried and outcome: Ibuprofen for the headache   Does not smoke. Has no h/o asthnma or allergies.      Video Start Time: 1:37pm        Patient Active Problem List   Diagnosis     Calculus of gallbladder     Acute pancreatitis     Alcoholic cirrhosis (H)     Coagulopathy (H)     Thrombocytopenia (H)     Secondary esophageal varices without bleeding (H)     Pancytopenia (H)     Hyperlipidemia LDL goal <160     Past Surgical History:   Procedure Laterality Date     COLONOSCOPY WITH CO2 INSUFFLATION N/A 1/10/2019    Procedure: COLONOSCOPY WITH CO2 INSUFFLATION;  Surgeon: Christopher Hinkle MD;  Location:  OR       Social History     Tobacco Use     Smoking status: Never Smoker     Smokeless tobacco: Never Used   Substance Use Topics     Alcohol use: No     History reviewed. No pertinent family  history.      Current Outpatient Medications   Medication Sig Dispense Refill     ibuprofen (ADVIL) 200 MG tablet Take 600-800 mg by mouth every 4 hours as needed for mild pain       Allergies   Allergen Reactions     Penicillins      Recent Labs   Lab Test 11/30/18  0733 06/22/18  1228 11/29/17  0911 10/11/16  0845 06/04/14  0835   *  --   --   --  117   HDL 32*  --   --   --  38*   TRIG 122  --   --   --  78   ALT  --  27 35 32 26   CR 0.92 0.88 0.89 0.82 0.83   GFRESTIMATED 86 90 90 >90  Non  GFR Calc   >90   GFRESTBLACK >90 >90 >90 >90   GFR Calc   >90   POTASSIUM 3.8 3.8 3.7 4.0 4.0   TSH  --   --  2.00  --  2.34      BP Readings from Last 3 Encounters:   01/10/19 124/88   11/30/18 119/60   06/22/18 112/71    Wt Readings from Last 3 Encounters:   11/30/18 113.3 kg (249 lb 11.2 oz)   06/22/18 112.5 kg (248 lb)   11/29/17 112.5 kg (248 lb)                    Reviewed and updated as needed this visit by Provider         Review of Systems   ROS COMP: CONSTITUTIONAL: NEGATIVE for fever, chills, change in weight  INTEGUMENTARY/SKIN: NEGATIVE for worrisome rashes, moles or lesions  EYES: NEGATIVE for vision changes or irritation  ENT/MOUTH: as above  RESP:as above  CV: NEGATIVE for chest pain, palpitations or peripheral edema  GI: NEGATIVE for nausea, abdominal pain, heartburn, or change in bowel habits  MUSCULOSKELETAL: NEGATIVE for significant arthralgias or myalgia  NEURO: NEGATIVE for weakness, dizziness or paresthesias  ENDOCRINE: NEGATIVE for temperature intolerance, skin/hair changes  HEME/ALLERGY/IMMUNE: NEGATIVE for bleeding problems  PSYCHIATRIC: NEGATIVE for changes in mood or affect      Objective    There were no vitals taken for this visit.  Estimated body mass index is 33.87 kg/m  as calculated from the following:    Height as of 11/30/18: 1.829 m (6').    Weight as of 11/30/18: 113.3 kg (249 lb 11.2 oz).  Physical Exam     GENERAL: healthy, alert and no  distress  EYES: Eyes grossly normal to inspection, conjunctivae and sclerae normal  HENT: normal cephalic/atraumatic.  External ears, nose and mouth without ulcers or lesions.  NECK: no asymmetry, masses or scars  RESP: no audible wheeze, cough, or visible cyanosis.  No visible retractions or increased work of breathing.  Able to speak fully in complete sentences.  NEURO: Cranial nerves grossly intact, mentation intact and speech normal  PSYCH: mentation appears normal, affect normal/bright, judgement and insight intact, normal speech and appearance well-groomed      Diagnostic Test Results:  Labs reviewed in Epic        Assessment & Plan     1. Close Exposure to Covid-19 Virus  Associated with having mild flulike symptoms.  It is very possible that patient might be developing COVID-19 infection himself with close exposure to his wife who is recently tested +2 days ago after having a days of symptoms.  [place] was given information [place].gov, and was recommended to try to for testing and that he does not need an order from a provider .  At this point, after discussing with patient, he is not exactly sure if he wants to get the testing done.  Reviewed information on self quarantining yourself at home, education handout sent to patient.  Emphasized on good hydration, Tylenol PRN for fever, rest, keeping the surfaces clean with disinfectants, stay away from work.      Regardless of if you have been tested or not:  Patient who have symptoms (cough, fever, or shortness of breath), need to isolate for 7 days from when symptoms started AND 72 hours after fever resolves (without fever reducing medications) AND improvement of respiratory symptoms (whichever is longer).      Isolate yourself at home (in own room/own bathroom if possible)    Do Not allow any visitors    Do Not go to work or school    Do Not go to Bapt[employer],  centers, shopping, or other public places.    Do Not shake hands.    Avoid close and intimate  contact with others (hugging, kissing).    Follow CDC recommendations for household cleaning of frequently touched services.     After the initial 7 days, continue to isolate yourself from household members as much as possible. To continue decrease the risk of community spread and exposure, you and any members of your household should limit activities in public for 14 days after starting home isolation.     You can reference the following CDC link for helpful home isolation/care tips:  https://www.cdc.gov/coronavirus/2019-ncov/downloads/10Things.pdf    Protect Others:    Cover Your Mouth and Nose with a mask, disposable tissue or wash cloth to avoid spreading germs to others.    Wash your hands and face frequently with soap and water    Call Back If: Breathing difficulty develops or you become worse.    For more information about COVID19 and options for caring for yourself at home, please visit the CDC website at https://www.cdc.gov/coronavirus/2019-ncov/about/steps-when-sick.html  For more options for care at [facility], please visit our website at [email or web address]    For more information, please use the [place] Department of Health COVID-19 Website: https://www.health.[facility].[place]/diseases/coronavirus/index.html  [place] Department of Health ([facility]) COVID-19 Hotlines (Interpreters available):      Health questions: Phone Number: [phone] or [phone] and Hours: 7 a.m. to 7 p.m.    Schools and  questions: Phone Number: [phone] or [phone] and Hours 7 a.m. to 7 p.m.      2. Flu-like symptoms  as above           Chart documentation done in part with Dragon Voice recognition Software. Although reviewed after completion, some word and grammatical error may remain.    See Patient Instructions    No follow-ups on file.    Tash Diego MD  [facility]      Video-Visit Details    Type of service:  Video Visit    Video End  Time:1:52pm    Originating Location (pt. Location): Home    Distant Location (provider location):  [facility]     Mode of Communication:  Video Conference via "[employer]"    No follow-ups on file.       Tash Diego MD

## 2020-04-28 NOTE — TELEPHONE ENCOUNTER
Patient has been exposed to Covid 19 and has symptoms.  Scheduled a Video Visit with Dr. Diego this afternoon.  He is looking to have an order for Covid testing.    Jaclyn Orosco RN, [facility]

## 2020-04-28 NOTE — TELEPHONE ENCOUNTER
Patient called back stating that he cannot leave his house to get the letter. Patient wants to know if the letter can be mailed to him. Please advise. Thank you.

## 2020-04-29 ENCOUNTER — OFFICE VISIT (OUTPATIENT)
Dept: URGENT CARE | Facility: URGENT CARE | Age: 55
End: 2020-04-29
Payer: COMMERCIAL

## 2020-04-29 DIAGNOSIS — Z20.822 SUSPECTED COVID-19 VIRUS INFECTION: Primary | ICD-10-CM

## 2020-04-29 PROCEDURE — 99000 SPECIMEN HANDLING OFFICE-LAB: CPT | Performed by: FAMILY MEDICINE

## 2020-04-29 PROCEDURE — 87635 SARS-COV-2 COVID-19 AMP PRB: CPT | Mod: 90 | Performed by: FAMILY MEDICINE

## 2020-04-30 ENCOUNTER — TELEPHONE (OUTPATIENT)
Dept: URGENT CARE | Facility: URGENT CARE | Age: 55
End: 2020-04-30

## 2020-04-30 LAB
SARS-COV-2 RNA SPEC QL NAA+PROBE: ABNORMAL
SPECIMEN SOURCE: ABNORMAL

## 2020-04-30 NOTE — RESULT ENCOUNTER NOTE
Coronavirus (COVID-19) Notification    Your result for COVID-19 is POSITIVE  See Telephone encounter

## 2020-04-30 NOTE — TELEPHONE ENCOUNTER
Coronavirus (COVID-19) Notification    Patient  Truong Oreilly    Reason for call  Notify of Positive Coronavirus (COVID-19) lab results, assess symptoms,  review [facility] recommendations    Lab Result    Lab test:  2019-nCoV rRt-PCR or SARS-CoV-2 PCR    Oropharyngeal AND/OR nasopharyngeal swabs is POSITIVE for 2019-nCoV RNA/SARS-COV-2 PCR (COVID-19 virus)        Assessment (Inquire about Patient's current symptoms)  We have been unable to reach Patient by phone at this time to notify of their Negative COVID-19 result.  Left voicemail message requesting a call back between 8 am to 6:30 p.m. to [phone] [facility] for results.   (Weekends, this line is available from 10A to 6:30P)    Letter was sent    [Name]  Tammie Severson, LPN

## 2020-04-30 NOTE — TELEPHONE ENCOUNTER
Coronavirus (COVID-19) Notification    Patient  Truong Oreilly    Reason for call  Notify of Positive Coronavirus (COVID-19) lab results, assess symptoms,  review [facility] recommendations    Lab Result    Lab test:  2019-nCoV rRt-PCR or SARS-CoV-2 PCR    Oropharyngeal AND/OR nasopharyngeal swabs is POSITIVE for 2019-nCoV RNA/SARS-COV-2 PCR (COVID-19 virus)    RN Recommendations/Instructions per [facility] Coronavirus COVID-19 recommendations    Brief introduction script  Hi, My name is Dorothy and I am calling on behalf of [employer].  We were notified that your Coronavirus test (COVID-19) for was POSITIVE for the virus.  I have some information to relay to you but first I wanted to mention that the [place] Dept of Health will be contacting you shortly [it's possible MD already called Patient] to talk to you more about how you are feeling and other people you have had contact with who might now also have the virus.  Also, [facility] is Partnering with the [facility] for Covid-19 research, you may be contacted directly by research staff.    Assessment (Inquire about Patient's current symptoms)  He is feeling about the same as when seen in .No fever yet, mild body aches, mild headache.  His wife was his close contact for covid-19.     If at time of call, Patients symptoms have worsened, the Patient should contact 911 or have someone drive them to Emergency Dept promptly:      If Patient calling 911, inform 911 personal that you have tested positive for the Coronavirus (COVID-19).  Place mask on and await 911 to arrive.    If Emergency Dept, If possible, please have another adult drive you to the Emergency Dept but you need to wear mask when in contact with other people.      Review information with Patient    Since you tested POSITIVE for the COVID-19 virus, it is important that you protect others from being exposed and infected with this virus.    [For safety, it's very important to  follow these rules.]    First, stay home and away from others (self-isolate) until:     You've had no fever--and no medicine that reduces fever--for 3 full days (72 hours). And      Your other symptoms have gotten better. For example, your cough or breathing has improved. And     At least 7 days have passed since your symptoms started.  During this time:    Don't go to work, school or anywhere else.     Stay away from others in your home. No hugging, kissing or shaking hands.   Stay in a specific room away from other people in your home as much as possible.  Use separate bathroom, if possible.  Wear a facemask if you need to be around other people in your home.     Don't let anyone visit.    Cover your mouth and nose with a mask, tissue or wash cloth to avoid spreading germs.       Wash your hands and face with soap and water.    You should restrict contact with pets and other animals while you are sick with COVID-19. Avoid contact with your pet, including petting, snuggling, being kissed or licked, and sharing food. When possible, have another member of your household care for your animals while you are sick.    You should not go back to work until you meet the guidelines above for ending your home isolation. You should meet these along with any other guidelines that your employer has.  We will be sending you a letter that includes much of this information and will serve as formal notice for your employer's medical guidelines for going back to work. You must meet the above guidelines before going back to work in person.  Letter sent (Yes/No):  Yes    How can I take care of myself?    Take Tylenol (acetaminophen) for fever or pain. If you have liver or kidney problems, ask your family doctor if it's okay to take Tylenol.   Take either:   - 650 mg (two 325 mg pills) every 4 to 6 hours, or   - 1,000 mg (two 500 mg pills) every 8 hours as needed.   - Note: Don't take more than 3,000 mg in one day.    If you have other  health problems (like cancer, heart failure, an organ transplant or severe kidney disease): Call your specialty clinic if you don't feel better in the next 2 days.      Know when to call 911: If your breathing is so bad that it keeps you from doing normal activities, call 911 or go to the emergency room. Tell them that you've been staying home and may have COVID-19.      Sign up for [employer]. We know it's scary to hear that you have COVID-19. We want to track your symptoms to make sure you're okay over the next 2 weeks. Please look for an email from [employer]--this is a free, online program that we'll use to keep in touch. To sign up, follow the link in the email. Learn more at [email or web address].    Where can I get more information?    To learn the [place]'s guidelines for staying home, please visit the [facility] website at https://www.health.[facility].[place]/diseases/coronavirus/basics.html.    To learn more about COVID-19 and how to care for yourself at home, please visit the CDC website at https://www.cdc.gov/coronavirus/2019-ncov/about/steps-when-sick.html.    For more options for care at [facility], please visit our website at [email or web address]      [place] Dept of Health ([facility]) COVID-19 Hotline:   [phone]    Dorothy Luciano RN  [facility]   Lung Nodule and ED Lab Result F/U RN  Epic pool (ED late result f/u RN): [id number]  # [phone]

## 2020-04-30 NOTE — LETTER
April 30, 2020        Truong Oreilly  [id number] [address] [place] [id number]    This letter provides a written record that you were tested for COVID-19 on 4/29/20.     Your result was positive.     This means that we found the virus that causes COVID-19 in your sample.    How can I protect others?    For safety, it s very important to follow these rules.    First, stay home and away from others (self-isolate) until:      You ve had no fever--and no medicine that reduces fever--for 3 full days (72 hours). And      Your other symptoms have gotten better. For example, your cough or breathing has improved. And     At least 7 days have passed since your symptoms started.    During this time:    Don t go to work, school or anywhere else.     Stay away from others in your home. No hugging, kissing or shaking hands.    Don t let anyone visit.    Cover your mouth and nose with a mask, tissue or wash cloth to avoid spreading germs.    Wash your hands and face often with soap and water.    You should not go back to work until you meet the guidelines above for ending your home isolation. You should meet these along with any other guidelines that your employer has.    Employers: This document serves as formal notice of your employee s medical guidelines for going back to work. They must meet the above guidelines before going back to work in person.    How can I take care of myself?    1. Take Tylenol (acetaminophen) for fever or pain. If you have liver or kidney problems, ask your family doctor if it s okay to take Tylenol.     Take either:     650 mg (two 325 mg pills) every 4 to 6 hours, or     1,000 mg (two 500 mg pills) every 8 hours as needed.     Note: Don t take more than 3,000 mg in one day.    2. If you have other health problems (like cancer, heart failure, an organ transplant or severe kidney disease): Call your specialty clinic if you don t feel better in the next 2 days.    3. Know when to call 911: If  your breathing is so bad that it keeps you from doing normal activities, call 911 or go to the emergency room. Tell them that you ve been staying home and may have COVID-19.    4. Sign up for [employer]. We know it s scary to hear that you have COVID-19. We want to track your symptoms to make sure you re okay over the next 2 weeks. Please look for an email from [employer]--this is a free, online program that we ll use to keep in touch. To sign up, follow the link in the email. Learn more at [email or web address].    5. Interested is participating in research? Visit the link below to view current clinical trials that apply to your situation:  [email or web address]      Where can I get more information?    To learn the [facility] guidelines for staying home, please visit the [facility] website at https://www.health.[facility].[place]/diseases/coronavirus/basics.html.    To learn more about COVID-19 and how to care for yourself at home, please visit the CDC website at https://www.cdc.gov/coronavirus/2019-ncov/about/steps-when-sick.html.    For more options for care at [facility], please visit our website at [email or web address]

## 2020-12-24 ENCOUNTER — DOCUMENTATION ONLY (OUTPATIENT)
Dept: LAB | Facility: CLINIC | Age: 55
End: 2020-12-24

## 2020-12-24 DIAGNOSIS — Z13.1 SCREENING FOR DIABETES MELLITUS: ICD-10-CM

## 2020-12-24 DIAGNOSIS — E78.5 HYPERLIPIDEMIA LDL GOAL <160: Primary | ICD-10-CM

## 2020-12-28 ENCOUNTER — DOCUMENTATION ONLY (OUTPATIENT)
Dept: LAB | Facility: CLINIC | Age: 55
End: 2020-12-28

## 2021-05-25 ENCOUNTER — RECORDS - HEALTHEAST (OUTPATIENT)
Dept: ADMINISTRATIVE | Facility: CLINIC | Age: 56
End: 2021-05-25

## 2021-05-26 ENCOUNTER — RECORDS - HEALTHEAST (OUTPATIENT)
Dept: ADMINISTRATIVE | Facility: CLINIC | Age: 56
End: 2021-05-26

## 2021-05-29 ENCOUNTER — RECORDS - HEALTHEAST (OUTPATIENT)
Dept: ADMINISTRATIVE | Facility: CLINIC | Age: 56
End: 2021-05-29

## (undated) DEVICE — SOL WATER IRRIG 1000ML BOTTLE 07139-09

## (undated) RX ORDER — SIMETHICONE 40MG/0.6ML
SUSPENSION, DROPS(FINAL DOSAGE FORM)(ML) ORAL
Status: DISPENSED
Start: 2019-01-10

## (undated) RX ORDER — FENTANYL CITRATE 50 UG/ML
INJECTION, SOLUTION INTRAMUSCULAR; INTRAVENOUS
Status: DISPENSED
Start: 2019-01-10